# Patient Record
Sex: MALE | Race: WHITE | NOT HISPANIC OR LATINO | URBAN - METROPOLITAN AREA
[De-identification: names, ages, dates, MRNs, and addresses within clinical notes are randomized per-mention and may not be internally consistent; named-entity substitution may affect disease eponyms.]

---

## 2017-07-27 ENCOUNTER — TELEPHONE (OUTPATIENT)
Dept: TRANSPLANT | Facility: CLINIC | Age: 62
End: 2017-07-27

## 2017-07-27 NOTE — TELEPHONE ENCOUNTER
"Logged in as: [email or web address]. Logout.  Incomplete   Pending   Registered   Archived Change Log Done Living Kidney Donor Evaluation Completed: 2017 11:35:06 CT Updated: 2017 11:36:11 CT  Donor Name: Gabe Barboza MRN: [id number] Note: : 1955 Age: 62Gender: Male Donor Height: 5  8\" Weight (lb): 207 BMI: 31.5  Donor Race:  Ethnicity: Not / Donor Preferred Language: English  Required?: No Current Marital Status:   Demographics: Home Address: [address] City: [place] State: [place] Zip: [address] Country: [place]  Best Phone: [phone] Alt Phone: Donor Email: [email or web address] Phone Type: Mobile Alt Phone Type:   Preferred Contact Time(s): 09:00 AM-11:00 AM Preferred Contact Day(s): Mon  Donor Screen: PASSED Donor Referred by: Tx Candidate Donor self reported ABO: Unknown  Recipient Information: Recipient Name (Last, First): Aniket Gallegos Recipient :    1948  ... Donor Relationship: Acquaintance Recipient Diagnosis: Recipient ABO:   MEDICAL HISTORY:  BPH  Dietary Supplement  Hypercholesterolemia  Hypothyroidism  Joint Pain/Arthritis NOS  Traumatic Brain Injury (TBI)  other (\"mood and sleep\")  MEDICATIONS:  Acetaminophen  Cholecalciferol  Lamotrigine  Levothyroxine  Simvastatin  Tamsulosin  Thiamine  SURGICAL HISTORY:  None Reported  ALLERGIES:  NKDA  SOCIAL HISTORY:  EtOH: Daily (1-2 drinks/day)  Illicit Drug Use: Denies  Tobacco: Denies  SELF-REPORTED FUNCTIONAL STATUS:  \"I am able to participate in strenuous sports such as swimming, singles tennis, football, basketball, or skiing\"  Exercise (>3X per week)  REVIEW OF ORGAN SYSTEMS: Airway or Lungs: No Blood Disorder: No Cancer: No Diabetes,Thyroid,Adrenal,Endocrine Disorder: Yes Digestive or Liver: No Heart or Circulatory System: No Immune Diseases: No Kidneys and Bladder: No Male Health: Yes Muscles,Bones,Joints: Yes Neuro: Yes Psych: No  FAMILY HISTORY: " Confirmed:  Denied:  Cancer (denies)  Diabetes (denies)  Heart Disease (denies)  Hypertension (denies)  Kidney Disease (denies)  Kidney Stones (denies)  DONOR INFORMATION:  Level of Education: High school or secondary school degree complete Employment Status: Full Time Employer: [employer] Medical Insurance Status: Has medical insurance Current Accommodation: Owns own home/apartment Living Arrangement: With spouse Allow Disclosure to Recipient: Yes Paired Kidney Exchange Education Level: None Paired Kidney Exchange Participation Consent: Unsure Donor Motivation: Highly motivated donor  HIGH RISK BEHAVIOR:  Blood transfusion < 12 months. (NO)  Commercial sex < 12 months. (NO)  Illicit IV drug use < 5yrs. (NO)  Male:male sexual contact < 5yrs. (NO)  Other high risk sexual contact < 12 months. (NO)  EMERGENCY CONTACT INFORMATION:  Primary Secondary First Name: Ayo Last Name: Tamra Phone Number: [phone] Relationship: Child  First Name: Laura  Last Name: Tamra Phone Number: [phone] Relationship: Spouse  REASON FOR DONATION:   He is a friend of mine and I would like to help.  PHYSICIAN CONTACT INFORMATION:  PCP  Name: [facility]   City: [facility] State: [place]  Phone: [phone]  ADDITIONAL NOTES:   REVIEWED BY:    Ashwini  TODAY'S DATE:   07/27/2017  ... Done  He is a friend of mine and I would like to help.
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
## 2017-08-21 ENCOUNTER — TELEPHONE (OUTPATIENT)
Dept: TRANSPLANT | Facility: CLINIC | Age: 62
End: 2017-08-21

## 2017-08-21 ENCOUNTER — DOCUMENTATION ONLY (OUTPATIENT)
Dept: TRANSPLANT | Facility: CLINIC | Age: 62
End: 2017-08-21

## 2017-08-21 NOTE — TELEPHONE ENCOUNTER
Is abo incompat(A+-was in [employer])Urine has small amt blood. Glucose=105.Average GFR=91. Now informed Gabe. Is interested in PEP. Will now send orders to do UA with Micro,FBS and A1C.

## 2017-08-21 NOTE — LETTER
PHYSICIAN ORDERS      DATE & TIME ISSUED: 2017 9:26 AM  PATIENT NAME: Gabe Barboza   : 1955     [facility] MR#  [id number]     DIAGNOSIS:  Potential Living Donor  ICD-10 CODE: Z00.5    Please do the following lab tests:       1)Fasting Blood Sugar(FBS)       2)A1C       3)Urinalysis(UA) with Microscopic    Any questions please call: Ciara at [phone]    Please fax these results to [phone]-ATTN:Ciara.        Joce Mcmillan MD  Surgical Director, Kidney Transplantation

## 2017-08-21 NOTE — LETTER
REIMBURSEMENT INFORMATION FOR LIVING ORGAN DONORS    LIVING ORGAN DONOR: This form MUST accompany & remain attached to Orders &  given to Provider and/or Healthcare Facility Business Office    PROVIDER/FACILITY INSTRUCTIONS: By accepting to perform these services for living organ  donation, the provider/facility agrees to exclusively bill the [facility] instead of billing  the patient or any insurance provider and agrees to accept the reimbursement, as described below, as  payment in full for services rendered.    PROVIDER BILLING INSTRUCTIONS:  1. [facility] agrees to pay for all authorized testing ordered by our transplant  program that is related to living organ donation. The attached orders/tests are part of the donor  Evaluation.    2. Do not bill the donor or donor's insurance. Send an itemized invoice, claim or statement to:    [facility]  Transplant Finance/Donor Billing  [address] [place] [address]    3. Billing statements must include the patient first and last name, date of birth, the CPT procedure code  and date of service. Please bill service on the ORIGINAL UBO4 or 1500 with appropriate CPT/HCPCS  codes along with W-9 and send to the above address to insure timely reimbursement.    4. Claims should be submitted no later than six months from the date when services are rendered.  Claims denied for late submission should not be billed to the donor or their private insurance carrier.    5. [facility] will reimburse all charges at 100% of the Medicare Fee Schedule as  defined in the Code of Federal Regulations (CFR) 42, Chapter IV. This is to be considered payment  in full. [facility], the patient, and/or the patient's insurance are NOT to  be billed any balance, co-payment, or deductible, per Medicare regulations. **ATTN: Facility  providing services for attached/enclosed Living  Donor Orders; If facility does NOT AGREE to  the reimbursement rate stated above, PLEASE DENY SERVICES & refer Donor/patient back to  their [facility] Transplant Center.    6. Patients are NOT to make any payments at the time of service.    Please forward this information to your billing department so that a donor account can be set up with  these instructions.    Should you have any questions, please contact the Solid Organ Transplant office and ask for donor billing  at [phone] or [phone], Monday - Friday, 8:00 a.m. to 4:00 p.m.  Thank you for your assistance.

## 2017-10-17 ENCOUNTER — TELEPHONE (OUTPATIENT)
Dept: TRANSPLANT | Facility: CLINIC | Age: 62
End: 2017-10-17

## 2017-10-17 NOTE — TELEPHONE ENCOUNTER
Gabe and I keep missing each other's return phone calls. Now LM for him asking him to look at his Email. UFO=906. A1C=5.4. Suggested a 2hr gtt test. Explained procedure to him. Asked him if he'd like to cont process.

## 2017-10-19 ENCOUNTER — TELEPHONE (OUTPATIENT)
Dept: TRANSPLANT | Facility: CLINIC | Age: 62
End: 2017-10-19

## 2017-10-19 NOTE — TELEPHONE ENCOUNTER
Attempted to reach sev x's and FABIO's as well as Michael tried to contact me. Now sent Email. Asked if he wanted to cont testing. Glucose=111. Sent orders for 2hr gtt with instructions. Asked to let me know either way.

## 2018-02-21 ENCOUNTER — TELEPHONE (OUTPATIENT)
Dept: TRANSPLANT | Facility: CLINIC | Age: 63
End: 2018-02-21

## 2018-02-21 NOTE — TELEPHONE ENCOUNTER
Informed Gabe his 2 hr gtt was normal at 101. States he's having hernia repair beginning of March. Informed him Eval is next step. Advised he'd eval later and he wants to come in on last week he has off from work for hernia repair. Sydney is still working on a couple things also. Was [place]. Went to [place] in past 3 months. Will ask SW to check into financial assist with possibly gas or hotel.

## 2018-02-22 ENCOUNTER — TELEPHONE (OUTPATIENT)
Dept: TRANSPLANT | Facility: CLINIC | Age: 63
End: 2018-02-22

## 2018-02-22 DIAGNOSIS — Z00.5 TRANSPLANT DONOR EVALUATION: ICD-10-CM

## 2018-02-22 NOTE — TELEPHONE ENCOUNTER
I spoke to Mr. Barboza about applying for the [facility] margarita.  He would like to submit an application.  I e-mailed him the application paperwork today.  I will also connect with his recipient's  to request information from him.    ZHANNA Goins, [facility]  Clinical  and Independent Donor Advocate  [facility]  Pager:  [phone]  Direct:  [phone]

## 2018-03-12 ENCOUNTER — TELEPHONE (OUTPATIENT)
Dept: TRANSPLANT | Facility: CLINIC | Age: 63
End: 2018-03-12

## 2018-03-12 NOTE — TELEPHONE ENCOUNTER
I left a message for  And Mrs. Barboza today indicating that due to the recipient's high income, they would not qualify for assistance through [facility], but I would be able to use some of our donor margarita money through the [facility] to assist with hotel accommodations for his living kidney donor evaluation.  I asked them to call me back to discuss.    ZHANNA Goins, [facility]  Clinical  and Independent Donor Advocate  [facility] Transplant Center  Pager:  [phone]  Direct:  [phone]

## 2018-03-22 ASSESSMENT — ENCOUNTER SYMPTOMS: ARTHRALGIAS: 1

## 2018-03-23 ENCOUNTER — RADIANT APPOINTMENT (OUTPATIENT)
Dept: GENERAL RADIOLOGY | Facility: CLINIC | Age: 63
End: 2018-03-23
Attending: INTERNAL MEDICINE

## 2018-03-23 ENCOUNTER — INFUSION THERAPY VISIT (OUTPATIENT)
Dept: INFUSION THERAPY | Facility: CLINIC | Age: 63
End: 2018-03-23
Attending: INTERNAL MEDICINE

## 2018-03-23 ENCOUNTER — ALLIED HEALTH/NURSE VISIT (OUTPATIENT)
Dept: TRANSPLANT | Facility: CLINIC | Age: 63
End: 2018-03-23
Attending: TRANSPLANT SURGERY

## 2018-03-23 ENCOUNTER — OFFICE VISIT (OUTPATIENT)
Dept: TRANSPLANT | Facility: CLINIC | Age: 63
End: 2018-03-23
Attending: TRANSPLANT SURGERY

## 2018-03-23 ENCOUNTER — OFFICE VISIT (OUTPATIENT)
Dept: NEPHROLOGY | Facility: CLINIC | Age: 63
End: 2018-03-23
Attending: TRANSPLANT SURGERY

## 2018-03-23 ENCOUNTER — RADIANT APPOINTMENT (OUTPATIENT)
Dept: CT IMAGING | Facility: CLINIC | Age: 63
End: 2018-03-23
Attending: INTERNAL MEDICINE

## 2018-03-23 VITALS
SYSTOLIC BLOOD PRESSURE: 126 MMHG | OXYGEN SATURATION: 98 % | DIASTOLIC BLOOD PRESSURE: 76 MMHG | BODY MASS INDEX: 32.15 KG/M2 | WEIGHT: 217.1 LBS | HEART RATE: 61 BPM | HEIGHT: 69 IN

## 2018-03-23 DIAGNOSIS — Z00.5 TRANSPLANT DONOR EVALUATION: Primary | ICD-10-CM

## 2018-03-23 DIAGNOSIS — Z00.5 TRANSPLANT DONOR EVALUATION: ICD-10-CM

## 2018-03-23 LAB
ABO + RH BLD: NORMAL
ABO + RH BLD: NORMAL
ALBUMIN SERPL-MCNC: 3.8 G/DL (ref 3.4–5)
ALBUMIN UR-MCNC: NEGATIVE MG/DL
ALP SERPL-CCNC: 49 U/L (ref 40–150)
ALT SERPL W P-5'-P-CCNC: 40 U/L (ref 0–70)
ANION GAP SERPL CALCULATED.3IONS-SCNC: 6 MMOL/L (ref 3–14)
APPEARANCE UR: CLEAR
APTT PPP: 30 SEC (ref 22–37)
AST SERPL W P-5'-P-CCNC: 22 U/L (ref 0–45)
BILIRUB SERPL-MCNC: 0.4 MG/DL (ref 0.2–1.3)
BILIRUB UR QL STRIP: NEGATIVE
BLD GP AB SCN SERPL QL: NORMAL
BLOOD BANK CMNT PATIENT-IMP: NORMAL
BLOOD BANK CMNT PATIENT-IMP: NORMAL
BUN SERPL-MCNC: 15 MG/DL (ref 7–30)
CALCIUM SERPL-MCNC: 8.9 MG/DL (ref 8.5–10.1)
CHLORIDE SERPL-SCNC: 107 MMOL/L (ref 94–109)
CHOLEST SERPL-MCNC: 98 MG/DL
CMV IGG SERPL QL IA: <0.2 AI (ref 0–0.8)
CO2 SERPL-SCNC: 26 MMOL/L (ref 20–32)
COLOR UR AUTO: YELLOW
CREAT SERPL-MCNC: 0.84 MG/DL (ref 0.66–1.25)
CREAT UR-MCNC: 76 MG/DL
EBV VCA IGG SER QL IA: >8 AI (ref 0–0.8)
EBV VCA IGM SER QL IA: <0.2 AI (ref 0–0.8)
ERYTHROCYTE [DISTWIDTH] IN BLOOD BY AUTOMATED COUNT: 12 % (ref 10–15)
GFR SERPL CREATININE-BSD FRML MDRD: >90 ML/MIN/1.7M2
GLUCOSE SERPL-MCNC: 106 MG/DL (ref 70–99)
GLUCOSE UR STRIP-MCNC: NEGATIVE MG/DL
HBA1C MFR BLD: 5.6 % (ref 4.3–6)
HBV CORE AB SERPL QL IA: NONREACTIVE
HBV SURFACE AB SERPL IA-ACNC: 16.09 M[IU]/ML
HBV SURFACE AG SERPL QL IA: NONREACTIVE
HCT VFR BLD AUTO: 43.6 % (ref 40–53)
HCV AB SERPL QL IA: NONREACTIVE
HDLC SERPL-MCNC: 44 MG/DL
HGB BLD-MCNC: 15 G/DL (ref 13.3–17.7)
HGB UR QL STRIP: ABNORMAL
HIV 1+2 AB+HIV1 P24 AG SERPL QL IA: NONREACTIVE
INR PPP: 1.17 (ref 0.86–1.14)
KETONES UR STRIP-MCNC: NEGATIVE MG/DL
LDLC SERPL CALC-MCNC: 42 MG/DL
LEUKOCYTE ESTERASE UR QL STRIP: NEGATIVE
MCH RBC QN AUTO: 29.5 PG (ref 26.5–33)
MCHC RBC AUTO-ENTMCNC: 34.4 G/DL (ref 31.5–36.5)
MCV RBC AUTO: 86 FL (ref 78–100)
MICROALBUMIN UR-MCNC: 6 MG/L
MICROALBUMIN/CREAT UR: 7.22 MG/G CR (ref 0–17)
MUCOUS THREADS #/AREA URNS LPF: PRESENT /LPF
NITRATE UR QL: NEGATIVE
NONHDLC SERPL-MCNC: 54 MG/DL
PH UR STRIP: 6 PH (ref 5–7)
PHOSPHATE SERPL-MCNC: 3 MG/DL (ref 2.5–4.5)
PLATELET # BLD AUTO: 159 10E9/L (ref 150–450)
POTASSIUM SERPL-SCNC: 4 MMOL/L (ref 3.4–5.3)
PROT SERPL-MCNC: 7 G/DL (ref 6.8–8.8)
PROT UR-MCNC: 0.06 G/L
PROT/CREAT 24H UR: 0.07 G/G CR (ref 0–0.2)
PSA SERPL-ACNC: 1.31 UG/L (ref 0–4)
RBC # BLD AUTO: 5.08 10E12/L (ref 4.4–5.9)
RBC #/AREA URNS AUTO: 1 /HPF (ref 0–2)
SODIUM SERPL-SCNC: 138 MMOL/L (ref 133–144)
SOURCE: ABNORMAL
SP GR UR STRIP: 1.01 (ref 1–1.03)
SPECIMEN EXP DATE BLD: NORMAL
T PALLIDUM IGG+IGM SER QL: NEGATIVE
TRIGL SERPL-MCNC: 58 MG/DL
URATE SERPL-MCNC: 4.3 MG/DL (ref 3.5–7.2)
UROBILINOGEN UR STRIP-MCNC: 0 MG/DL (ref 0–2)
WBC # BLD AUTO: 4.8 10E9/L (ref 4–11)
WBC #/AREA URNS AUTO: <1 /HPF (ref 0–5)

## 2018-03-23 PROCEDURE — 25000128 H RX IP 250 OP 636: Mod: ZF | Performed by: INTERNAL MEDICINE

## 2018-03-23 PROCEDURE — 36415 COLL VENOUS BLD VENIPUNCTURE: CPT | Performed by: INTERNAL MEDICINE

## 2018-03-23 PROCEDURE — 86780 TREPONEMA PALLIDUM: CPT | Performed by: INTERNAL MEDICINE

## 2018-03-23 PROCEDURE — 86644 CMV ANTIBODY: CPT | Performed by: INTERNAL MEDICINE

## 2018-03-23 PROCEDURE — 86665 EPSTEIN-BARR CAPSID VCA: CPT | Performed by: INTERNAL MEDICINE

## 2018-03-23 PROCEDURE — 86706 HEP B SURFACE ANTIBODY: CPT | Performed by: INTERNAL MEDICINE

## 2018-03-23 PROCEDURE — 40000866 ZZHCL STATISTIC HIV 1/2 ANTIGEN/ANTIBODY PRETRANSPLANT ONLY: Performed by: INTERNAL MEDICINE

## 2018-03-23 PROCEDURE — 86682 HELMINTH ANTIBODY: CPT | Performed by: INTERNAL MEDICINE

## 2018-03-23 PROCEDURE — 86665 EPSTEIN-BARR CAPSID VCA: CPT | Mod: 91 | Performed by: INTERNAL MEDICINE

## 2018-03-23 PROCEDURE — G0463 HOSPITAL OUTPT CLINIC VISIT: HCPCS | Mod: ZF

## 2018-03-23 PROCEDURE — 86850 RBC ANTIBODY SCREEN: CPT | Performed by: INTERNAL MEDICINE

## 2018-03-23 PROCEDURE — 85027 COMPLETE CBC AUTOMATED: CPT | Performed by: INTERNAL MEDICINE

## 2018-03-23 PROCEDURE — 86803 HEPATITIS C AB TEST: CPT | Performed by: INTERNAL MEDICINE

## 2018-03-23 PROCEDURE — 86901 BLOOD TYPING SEROLOGIC RH(D): CPT | Performed by: INTERNAL MEDICINE

## 2018-03-23 PROCEDURE — 84100 ASSAY OF PHOSPHORUS: CPT | Performed by: INTERNAL MEDICINE

## 2018-03-23 PROCEDURE — 85730 THROMBOPLASTIN TIME PARTIAL: CPT | Performed by: INTERNAL MEDICINE

## 2018-03-23 PROCEDURE — 83036 HEMOGLOBIN GLYCOSYLATED A1C: CPT | Performed by: INTERNAL MEDICINE

## 2018-03-23 PROCEDURE — 85610 PROTHROMBIN TIME: CPT | Performed by: INTERNAL MEDICINE

## 2018-03-23 PROCEDURE — 86480 TB TEST CELL IMMUN MEASURE: CPT | Performed by: INTERNAL MEDICINE

## 2018-03-23 PROCEDURE — 80053 COMPREHEN METABOLIC PANEL: CPT | Performed by: INTERNAL MEDICINE

## 2018-03-23 PROCEDURE — 80061 LIPID PANEL: CPT | Performed by: INTERNAL MEDICINE

## 2018-03-23 PROCEDURE — 82542 COL CHROMOTOGRAPHY QUAL/QUAN: CPT | Performed by: INTERNAL MEDICINE

## 2018-03-23 PROCEDURE — 86704 HEP B CORE ANTIBODY TOTAL: CPT | Performed by: INTERNAL MEDICINE

## 2018-03-23 PROCEDURE — 82043 UR ALBUMIN QUANTITATIVE: CPT | Performed by: INTERNAL MEDICINE

## 2018-03-23 PROCEDURE — 84156 ASSAY OF PROTEIN URINE: CPT | Performed by: INTERNAL MEDICINE

## 2018-03-23 PROCEDURE — 86905 BLOOD TYPING RBC ANTIGENS: CPT | Performed by: INTERNAL MEDICINE

## 2018-03-23 PROCEDURE — 84550 ASSAY OF BLOOD/URIC ACID: CPT | Performed by: INTERNAL MEDICINE

## 2018-03-23 PROCEDURE — 86900 BLOOD TYPING SEROLOGIC ABO: CPT | Performed by: INTERNAL MEDICINE

## 2018-03-23 PROCEDURE — 81001 URINALYSIS AUTO W/SCOPE: CPT | Performed by: INTERNAL MEDICINE

## 2018-03-23 PROCEDURE — 87340 HEPATITIS B SURFACE AG IA: CPT | Performed by: INTERNAL MEDICINE

## 2018-03-23 PROCEDURE — G0103 PSA SCREENING: HCPCS | Performed by: INTERNAL MEDICINE

## 2018-03-23 PROCEDURE — 40000269 ZZH STATISTIC NO CHARGE FACILITY FEE

## 2018-03-23 RX ORDER — SUCRALFATE 1 G/1
1 TABLET ORAL 4 TIMES DAILY
COMMUNITY

## 2018-03-23 RX ORDER — IOPAMIDOL 755 MG/ML
100 INJECTION, SOLUTION INTRAVASCULAR ONCE
Status: COMPLETED | OUTPATIENT
Start: 2018-03-23 | End: 2018-03-23

## 2018-03-23 RX ORDER — LAMOTRIGINE 25 MG/1
25 TABLET ORAL AT BEDTIME
COMMUNITY

## 2018-03-23 RX ORDER — ACETAMINOPHEN 500 MG
1000 TABLET ORAL EVERY 6 HOURS PRN
COMMUNITY

## 2018-03-23 RX ADMIN — IOHEXOL 5 ML: 300 INJECTION, SOLUTION INTRAVENOUS at 09:34

## 2018-03-23 RX ADMIN — IOPAMIDOL 100 ML: 755 INJECTION, SOLUTION INTRAVASCULAR at 14:25

## 2018-03-23 ASSESSMENT — PAIN SCALES - GENERAL: PAINLEVEL: NO PAIN (0)

## 2018-03-23 NOTE — LETTER
3/23/2018       RE: Gabe Barboza  [address] [facility] [address]     Dear Colleague,    Thank you for referring your patient, Gabe Barboza, to the [facility] NEPHROLOGY at [facility]. Please see a copy of my visit note below.    Assessment and Plan:  1. Prospective kidney transplant donor with no issues that need to be addressed prior to donation. Patient's blood pressure is acceptable at this visit, kidney function appears to be acceptable with Iohexol pending, and urinalysis shows small blood on dipstick, but no wbc or rbc.    The patient currently has mid line abdominal hernia that he is planning on having repaired and at the same time is planning to have an inguinal hernia repair.    He does have a history of chronic back pain for which she currently uses naproxen.  I had recommended that he attempt to use Tylenol only for her chronic back pain.  If his pain control with Tylenol alone is acceptable I think that this would be useful information for the patient to use in making a decision of being a potential donor.    The patient's body mass index is 32.  I did discuss with him potential need for weight loss prior to donation that will be determined at her selection committee.    Hematuria by dipstick: The patient has no red blood cells in his urine with appropriately concentrated urine.  This likely represents false-positive in the setting of myoglobinuria or other adulterants such as vitamin C.    The patient is a 62-year-old male with a history of hyperlipidemia giving him to risk factors for occult coronary artery disease.  He is quite healthy and has had no exertional chest pain or shortness of breath.    I spent 45 minutes with this patient of which greater than 25 minutes was spent face-to-face counseling regarding the risks benefits and alternatives of being a potential kidney donor.  All questions were answered.    Discussed the risks of donating a kidney,  including the surgical risk and the possible risks of living with one kidney.    Education about expected post-donation kidney function and how chronic kidney disease (CKD) and end stage kidney disease (ESKD) might potentially impact the donor in the future, include, but not limited to:       - On average, donors will have 25-35% permanent loss of kidney function at donation.       - Baseline risk of ESKD may slightly exceed that of members of the general population with the same demographic profile.       - Donor risks must be interpreted in light of known epidemiology of both CKD or ESKD, such as that CKD generally develops in midlife (40-50 years old) and ESKD generally develops after age 60.       - Medical evaluation of young potential donors cannot predict lifetime risk of CKD or ESKD.       - Donors may be at higher risk for CKD if they sustain damage to the remaining kidney.       - Development of CKD and progression of ESKD may be more rapid with only 1 kidney.       - Some type of kidney replacement therapy, either kidney transplant or dialysis, is required when reaching ESKD.    Potential medical or surgical risks include, but not limited to:       - Death.       - Scars, pain, fatigue, and other consequences typical of any surgical procedure.       - Decreased kidney function.       - Abdominal or bowel symptoms, such as bloating and nausea, and developing bowel obstruction.       - Kidney failure (ESKD) and the need for a kidney transplant or dialysis for the donor.       - Impact of obesity, hypertension, or other donor-specific medical conditions on morbidity and mortality of the potential donor.    Patients overall evaluation will be discussed with the transplant team and a final recommendation on the patients' suitability to be a kidney transplant donor will be made at that time.    Consult:  Gabe Barboza was seen in consultation at the request of Dr. Joce Mcmillan for evaluation as a potential  kidney transplant donor.    Reason for Visit:  Gabe Barboza is a 62 year old male who presents for a kidney donor evaluation.  Patient would like to donate to a friend.    HPI:    Patient is a 63 yo male with no known past medical problems here for kidney donor evaluation.     Medical:  BPH  Insomnia - on lamictal  HLD  Hiatal hernia - on sucralfate  Back pain - aleve 1 tab daily.   Hypovitaminosis D    No surgeries currently, but planning repair of inguinal and umbilical hernia.     No known family hx of kidney disease.     No cigs - quit 40 years ago. Insignificant exposure. 2 drinks / day. No illicits.     Currently the patient denies any headaches or vision changes.  He has no problems with swallowing.  He has no chest pain or shortness of breath.  He denies any abdominal pain.  He has no dysuria or hematuria.  He denies any constipation or diarrhea.  He has no muscle weakness or numbness.  He denies rash.         Kidney Disease Hx:       h/o Kidney Problems: No  Family h/o Genetic Kidney Disease: No       h/o HTN: No    Usual BP: Not checked       h/o Protein in Urine: No  h/o Blood in Urine: No       h/o Kidney Stones: No  h/o Kidney Injury: No       h/o Recurrent UTI: No  h/o Genitourinary Problems: No       h/o Chronic NSAID Use: Yes 1 aleve daily x 3 years.         Other Medical Hx:       h/o DM: No   h/o Gestational DM: Not applicable       h/o Preeclampsia: Not applicable          h/o Gastrointestinal, Pancreas or Liver Problems: No       h/o Lung or Heart Problems: No       h/o Hematologic Problems: No  h/o Bleeding or Clotting Problems: No       h/o Cancer: No       h/o Infection Problems: No         Skin Cancer Risk:       h/o more than 50 moles: No       h/o extensive sun exposure: No       h/o melanoma: No       Family h/o melanoma: No         Mental Health Assessment:       h/o Depression: No       h/o Psychiatric Illness: No       h/o Suicidal Attempt(s): No    ROS:   A comprehensive review of  systems was obtained and negative, except as noted in the HPI or PMH.    PMH:   History was taken from the patient as noted below.  Past Medical History:   Diagnosis Date     BPH (benign prostatic hyperplasia)      Hernia of abdominal wall      HLD (hyperlipidemia)      Hypovitaminosis D      Insomnia        PSH:   History reviewed. No pertinent surgical history.  Personal or family history of anesthesia problems: No    Family Hx:   Family History   Problem Relation Age of Onset     Coronary Artery Disease Mother      Hypertension Mother           Specific Family Hx:       FH of DM: No       FH of CAD: Yes  Mother - CHF at age 80's FH of HTN: Yes mother       FH of Cancer: Yes dad - lung  FH of Kidney Cancer: No    Personal Hx:   Social History     Social History     Marital status:      Spouse name: N/A     Number of children: N/A     Years of education: N/A     Occupational History     Not on file.     Social History Main Topics     Smoking status: Former Smoker     Types: Cigarettes     Smokeless tobacco: Never Used      Comment: quit smoking 35 years ago.      Alcohol use Yes      Comment: 1-2 drinks a day      Drug use: No     Sexual activity: Not on file     Other Topics Concern     Not on file     Social History Narrative     No narrative on file          Specific Social Hx:       Health Insurance Status: Yes       Employment Status: Full time  Occupation:                   Living Arrangements: lives with their spouse       Social Support: Yes       Presence of increased risk for disease transmission behaviors as defined by PHS guidelines: No        Allergies:  No Known Allergies    Medications:  Prior to Admission medications    Medication Sig Start Date End Date Taking? Authorizing Provider   lamoTRIgine (LAMICTAL) 25 MG tablet Take 25 mg by mouth At Bedtime    Reported, Patient   LEVOTHYROXINE SODIUM PO Take 150 mcg by mouth    Reported, Patient   SIMVASTATIN PO Take 40 mg by mouth At  "Bedtime    Reported, Patient   sucralfate (CARAFATE) 1 GM tablet Take 1 g by mouth 4 times daily    Reported, Patient   TAMSULOSIN HCL PO Take 0.4 mg by mouth At Bedtime    Reported, Patient   acetaminophen (TYLENOL) 500 MG tablet Take 1,000 mg by mouth every 6 hours as needed for mild pain    Reported, Patient   VITAMIN D, CHOLECALCIFEROL, PO Take 1,000 Units by mouth daily    Reported, Patient   GLUCOSAMINE SULFATE PO Take 4,000 mg by mouth daily    Reported, Patient   NAPROXEN PO Take 220 mg by mouth 2 times daily as needed for moderate pain    Reported, Patient   THIAMINE HCL PO Take 50 mg by mouth daily    Reported, Patient     Vitals:  Vital Signs 3/23/2018 3/23/2018 3/23/2018   Systolic 130 114 126   Diastolic 85 78 76   Pulse 61 - -   Weight (LB) 217 lb 1.6 oz - -   Height 5' 9\" - -   BMI (Calculated) 32.13 - -   Pain - - -   O2 98 - -     Exam:   GENERAL APPEARANCE: alert and no distress  HENT: mouth without ulcers or lesions  LYMPHATICS: no cervical or supraclavicular nodes  RESP: lungs clear to auscultation - no rales, rhonchi or wheezes  CV: regular rhythm, normal rate, no rub, no murmur  EDEMA: no LE edema bilaterally  ABDOMEN: soft, nondistended, nontender, bowel sounds normal; midline small 1 cm fascial defect about 7 cm above umbilicus.  MS: extremities normal - no gross deformities noted, no evidence of inflammation in joints, no muscle tenderness  SKIN: no rash    Results:   Labs and imaging were ordered for this visit and reviewed by me.  Recent Results (from the past 336 hour(s))   EKG 12-lead complete w/read - Clinics    Collection Time: 03/23/18  8:12 AM   Result Value Ref Range    Interpretation ECG Click View Image link to view waveform and result    Routine UA with microscopic    Collection Time: 03/23/18  8:58 AM   Result Value Ref Range    Color Urine Yellow     Appearance Urine Clear     Glucose Urine Negative NEG^Negative mg/dL    Bilirubin Urine Negative NEG^Negative    Ketones Urine " Negative NEG^Negative mg/dL    Specific Gravity Urine 1.011 1.003 - 1.035    Blood Urine Small (A) NEG^Negative    pH Urine 6.0 5.0 - 7.0 pH    Protein Albumin Urine Negative NEG^Negative mg/dL    Urobilinogen mg/dL 0.0 0.0 - 2.0 mg/dL    Nitrite Urine Negative NEG^Negative    Leukocyte Esterase Urine Negative NEG^Negative    Source Midstream Urine     WBC Urine <1 0 - 5 /HPF    RBC Urine 1 0 - 2 /HPF    Mucous Urine Present (A) NEG^Negative /LPF   Albumin Random Urine Quantitative with Creat Ratio    Collection Time: 03/23/18  8:58 AM   Result Value Ref Range    Creatinine Urine 76 mg/dL    Albumin Urine mg/L 6 mg/L    Albumin Urine mg/g Cr 7.22 0 - 17 mg/g Cr   Protein  random urine with Creat Ratio    Collection Time: 03/23/18  8:58 AM   Result Value Ref Range    Protein Random Urine 0.06 g/L    Protein Total Urine g/gr Creatinine 0.07 0 - 0.2 g/g Cr   ABO/Rh type and screen    Collection Time: 03/23/18  9:10 AM   Result Value Ref Range    ABO A     RH(D) Pos     Antibody Screen Neg     Test Valid Only At          [facility]    Specimen Expires 03/26/2018    Lipid Profile    Collection Time: 03/23/18  9:11 AM   Result Value Ref Range    Cholesterol 98 <200 mg/dL    Triglycerides 58 <150 mg/dL    HDL Cholesterol 44 >39 mg/dL    LDL Cholesterol Calculated 42 <100 mg/dL    Non HDL Cholesterol 54 <130 mg/dL   Comprehensive metabolic panel    Collection Time: 03/23/18  9:11 AM   Result Value Ref Range    Sodium 138 133 - 144 mmol/L    Potassium 4.0 3.4 - 5.3 mmol/L    Chloride 107 94 - 109 mmol/L    Carbon Dioxide 26 20 - 32 mmol/L    Anion Gap 6 3 - 14 mmol/L    Glucose 106 (H) 70 - 99 mg/dL    Urea Nitrogen 15 7 - 30 mg/dL    Creatinine 0.84 0.66 - 1.25 mg/dL    GFR Estimate >90 >60 mL/min/1.7m2    GFR Estimate If Black >90 >60 mL/min/1.7m2    Calcium 8.9 8.5 - 10.1 mg/dL    Bilirubin Total 0.4 0.2 - 1.3 mg/dL    Albumin 3.8 3.4 - 5.0 g/dL    Protein Total 7.0 6.8 - 8.8  g/dL    Alkaline Phosphatase 49 40 - 150 U/L    ALT 40 0 - 70 U/L    AST 22 0 - 45 U/L   Phosphorus    Collection Time: 03/23/18  9:11 AM   Result Value Ref Range    Phosphorus 3.0 2.5 - 4.5 mg/dL   Prostate spec antigen screen    Collection Time: 03/23/18  9:11 AM   Result Value Ref Range    PSA 1.31 0 - 4 ug/L   Hemoglobin A1c    Collection Time: 03/23/18  9:11 AM   Result Value Ref Range    Hemoglobin A1C 5.6 4.3 - 6.0 %   INR    Collection Time: 03/23/18  9:11 AM   Result Value Ref Range    INR 1.17 (H) 0.86 - 1.14   Partial thromboplastin time    Collection Time: 03/23/18  9:11 AM   Result Value Ref Range    PTT 30 22 - 37 sec   CBC with platelets    Collection Time: 03/23/18  9:11 AM   Result Value Ref Range    WBC 4.8 4.0 - 11.0 10e9/L    RBC Count 5.08 4.4 - 5.9 10e12/L    Hemoglobin 15.0 13.3 - 17.7 g/dL    Hematocrit 43.6 40.0 - 53.0 %    MCV 86 78 - 100 fl    MCH 29.5 26.5 - 33.0 pg    MCHC 34.4 31.5 - 36.5 g/dL    RDW 12.0 10.0 - 15.0 %    Platelet Count 159 150 - 450 10e9/L   Uric acid    Collection Time: 03/23/18  9:11 AM   Result Value Ref Range    Uric Acid 4.3 3.5 - 7.2 mg/dL   Blood Group A Subtype    Collection Time: 03/23/18  9:11 AM   Result Value Ref Range    Antigen Type A1 Positive         Again, thank you for allowing me to participate in the care of your patient.      Sincerely,    Dany Martinez MD

## 2018-03-23 NOTE — DISCHARGE INSTRUCTIONS

## 2018-03-23 NOTE — LETTER
3/23/2018       RE: Gabe Barboza  [address] [facility] [address]     Dear Colleague,    Thank you for referring your patient, Gabe Barboza, to the [facility] SOLID ORGAN TRANSPLANT at [facility]. Please see a copy of my visit note below.    RE: Gabe Barboza  [facility] #[id number]           I saw your patient, Gabe Barboza, in consultation in our pretransplant clinic. He was here at clinic for evaluation as a possible kidney donor. He presented to clinic today with his wife.  Our donor coordinator shared our center-specific results with him.  We discussed:    (1) The risks of donation--including mortality (0.03% risk) and morbidity (approximately 1% risk of major morbidity).  We discussed the major reported causes of death after kidney donation (bleeding, infection, pulmonary embolism).  We discussed the potential complications, including:  bleeding requiring reoperation, infection requiring reoperation, pneumonia, bowel obstruction, infection at the site of the incision, hernia at the site of the incision, deep vein thrombosis, deep vein thrombosis with associated pulmonary embolism, and urinary tract infection.  I told him I could not possibly name all of the risks, but that he was at risk for any complications that could occur in any operation (and that a local library would have books/resources that could provide more detail).       (2) We also discussed the long-term risks of living with one kidney.  We talked in detail about the new publications suggesting slightly increased long-term risk of ESRD after donor nephrectomy.  We discussed the fact that most of the ESRD that has developed after donation has occurred in those donating to a relative; and that it is known that individuals who have a relative with ESRD are at increased risk of ESRD.     (3) The hospital stay and the fact that if all goes well, discharge would occur within two to three days after donor  nephrectomy.  We also discussed the fact that there would be no diet or fluid restrictions (again if all went well), and that the only new medication that he would be on would be pain medication.  We discussed the fact that most patients are on Tylenol or something similar within five to six days of surgery.      (4) The recovery time after surgery and the restrictions that are necessary:  a) no driving for a couple of weeks (or until he felt that his reaction would be adequate if he had to slam on the brakes; and b) no lifting over 10 pounds or any exercise that would stretch his abdominal muscles for six weeks (to allow the muscles to heal so that he doesn't develop a hernia).  We also discussed return to work, which could occur in approximately three to four weeks if he had a desk job or would require not returning to work for at least six weeks if the job required any heavy lifting or exercise.  We discussed the potential for not getting his pep and energy back for anywhere from two to six weeks after surgery and how there was a tremendous individual variation in return of full energy level.  I discussed our donor follow-up studies; and that in our surveys, 90% of donors had their energy back by 6 weeks postdonation.    (5) The concern about long distance travel for the first couple of weeks post-donation.  We discussed the risks of deep vein thrombosis associated with plane or car travel.  I recommended that, if flying, he should get up and walk around every 30-40 minutes; if driving he should ask the  to stop the car every 30-45 minutes so Mr. Barboza could take a short walk.    (6) The fact that the recipient could be on a waiting list for  donor transplant and would ultimately receive a kidney if Mr. Barboza did not donate.      I attempted to answer any remaining questions.  I also told him that should he have any questions, he should feel free to contact us.  We would be glad to answer any  questions either over the phone or at another clinic visit.  His transplant coordinator Nichole Voges and may be reached at [phone].  Thank you for the opportunity to see him.    I spent 30 minutes with this patient.  Over 95% that time was spent in counseling and coordination of care.             Yours truly,               Joce Mcmillan MD         Professor of Surgery         ([phone])      AJ/st    Again, thank you for allowing me to participate in the care of your patient.      Sincerely,    Joce Mcmillan MD

## 2018-03-23 NOTE — MR AVS SNAPSHOT
After Visit Summary   3/23/2018    Gabe Barboza    MRN: [id number]           Patient Information     Date Of Birth          1955        Visit Information        Provider Department      3/23/2018 8:30 AM UC 47 ATC; UC SPEC INFUSION [facility] Specialty and Procedure        Today's Diagnoses     Transplant donor evaluation    -  1       Follow-ups after your visit        Who to contact     If you have questions or need follow up information about today's clinic visit or your schedule please contact [facility] SPECIALTY AND PROCEDURE directly at [phone].  Normal or non-critical lab and imaging results will be communicated to you by mydecohart, letter or phone within 4 business days after the clinic has received the results. If you do not hear from us within 7 days, please contact the clinic through [employer] or phone. If you have a critical or abnormal lab result, we will notify you by phone as soon as possible.  Submit refill requests through [employer] or call your pharmacy and they will forward the refill request to us. Please allow 3 business days for your refill to be completed.          Additional Information About Your Visit        MyChart Information     [employer] gives you secure access to your electronic health record. If you see a primary care provider, you can also send messages to your care team and make appointments. If you have questions, please call your primary care clinic.  If you do not have a primary care provider, please call [phone] and they will assist you.        Care EveryWhere ID     This is your Care EveryWhere ID. This could be used by other organizations to access your [place] medical records  [id number]         Blood Pressure from Last 3 Encounters:   03/23/18 126/76    Weight from Last 3 Encounters:   03/23/18 98.5 kg (217 lb 1.6 oz)              We Performed the Following     Iohexol        Primary Care Provider  Fax #    [facility] [phone] [address] [facility] [address]        Equal Access to Services     HANNA VALENCIA : Jj Reyes, waparminderda jarrettkeiha, himajane arevalokeagankwan alfredemilykwan, chani flores paulaalma cortésraealex thomas. So [facility] [phone].    ATENCIÓN: Si habla español, tiene a martin disposición servicios gratuitos de asistencia lingüística. Carmen al [phone].    We comply with applicable federal civil rights laws and [place] laws. We do not discriminate on the basis of race, color, national origin, age, disability, sex, sexual orientation, or gender identity.            Thank you!     Thank you for choosing [facility] SPECIALTY AND PROCEDURE  for your care. Our goal is always to provide you with excellent care. Hearing back from our patients is one way we can continue to improve our services. Please take a few minutes to complete the written survey that you may receive in the mail after your visit with us. Thank you!             Your Updated Medication List - Protect others around you: Learn how to safely use, store and throw away your medicines at www.disposemymeds.org.          This list is accurate as of 3/23/18 11:59 PM.  Always use your most recent med list.                   Brand Name Dispense Instructions for use Diagnosis    acetaminophen 500 MG tablet    TYLENOL     Take 1,000 mg by mouth every 6 hours as needed for mild pain        GLUCOSAMINE SULFATE PO      Take 4,000 mg by mouth daily        lamoTRIgine 25 MG tablet    LaMICtal     Take 25 mg by mouth At Bedtime        LEVOTHYROXINE SODIUM PO      Take 150 mcg by mouth        NAPROXEN PO      Take 220 mg by mouth 2 times daily as needed for moderate pain        SIMVASTATIN PO      Take 40 mg by mouth At Bedtime        sucralfate 1 GM tablet    CARAFATE     Take 1 g by mouth 4 times daily        TAMSULOSIN HCL PO      Take 0.4 mg by mouth At Bedtime        THIAMINE HCL PO      Take  50 mg by mouth daily        VITAMIN D (CHOLECALCIFEROL) PO      Take 1,000 Units by mouth daily

## 2018-03-23 NOTE — NURSING NOTE
"Chief Complaint   Patient presents with     Transplant Donor Evaluation     Kidney Donor Eval        Initial /85  Pulse 61  Ht 1.753 m (5' 9\")  Wt 98.5 kg (217 lb 1.6 oz)  SpO2 98%  BMI 32.06 kg/m2 Estimated body mass index is 32.06 kg/(m^2) as calculated from the following:    Height as of this encounter: 1.753 m (5' 9\").    Weight as of this encounter: 98.5 kg (217 lb 1.6 oz).  Medication Reconciliation: complete    "

## 2018-03-23 NOTE — MR AVS SNAPSHOT
After Visit Summary   3/23/2018    Gabe Barboza    MRN: [id number]           Patient Information     Date Of Birth          1955        Visit Information        Provider Department      3/23/2018 10:00 AM Adriana Obrien LICSW [facility] Solid Organ Transplant        Today's Diagnoses     Transplant donor evaluation    -  1       Follow-ups after your visit        Who to contact     If you have questions or need follow up information about today's clinic visit or your schedule please contact [facility] SOLID ORGAN TRANSPLANT directly at [phone].  Normal or non-critical lab and imaging results will be communicated to you by Shogetherhart, letter or phone within 4 business days after the clinic has received the results. If you do not hear from us within 7 days, please contact the clinic through [employer] [email or web address] or phone. If you have a critical or abnormal lab result, we will notify you by phone as soon as possible.  Submit refill requests through [employer] or call your pharmacy and they will forward the refill request to us. Please allow 3 business days for your refill to be completed.          Additional Information About Your Visit        MyChart Information     [employer] gives you secure access to your electronic health record. If you see a primary care provider, you can also send messages to your care team and make appointments. If you have questions, please call your primary care clinic.  If you do not have a primary care provider, please call [phone] and they will assist you.        Care EveryWhere ID     This is your Care EveryWhere ID. This could be used by other organizations to access your [place] medical records  [id number]         Blood Pressure from Last 3 Encounters:   03/23/18 126/76    Weight from Last 3 Encounters:   03/23/18 98.5 kg (217 lb 1.6 oz)              Today, you had the following     No orders found for display       Primary Care Provider Fax #    [facility]  [place] [phone]       [facility] [facility] [address]        Equal Access to Services     ANTHONYMILES ANNIE : Hadii freddy Reyes, justin murillo, crystroy arevalokeagankwan peterson, chani cortésraealex thomas. So [facility] [phone].    ATENCIÓN: Si habla español, tiene a martin disposición servicios gratuitos de asistencia lingüística. Llame al [phone].    We comply with applicable federal civil rights laws and [place] laws. We do not discriminate on the basis of race, color, national origin, age, disability, sex, sexual orientation, or gender identity.            Thank you!     Thank you for choosing [facility] SOLID ORGAN TRANSPLANT  for your care. Our goal is always to provide you with excellent care. Hearing back from our patients is one way we can continue to improve our services. Please take a few minutes to complete the written survey that you may receive in the mail after your visit with us. Thank you!             Your Updated Medication List - Protect others around you: Learn how to safely use, store and throw away your medicines at www.disposemymeds.org.          This list is accurate as of 3/23/18 11:59 PM.  Always use your most recent med list.                   Brand Name Dispense Instructions for use Diagnosis    acetaminophen 500 MG tablet    TYLENOL     Take 1,000 mg by mouth every 6 hours as needed for mild pain        GLUCOSAMINE SULFATE PO      Take 4,000 mg by mouth daily        lamoTRIgine 25 MG tablet    LaMICtal     Take 25 mg by mouth At Bedtime        LEVOTHYROXINE SODIUM PO      Take 150 mcg by mouth        NAPROXEN PO      Take 220 mg by mouth 2 times daily as needed for moderate pain        SIMVASTATIN PO      Take 40 mg by mouth At Bedtime        sucralfate 1 GM tablet    CARAFATE     Take 1 g by mouth 4 times daily        TAMSULOSIN HCL PO      Take 0.4 mg by mouth At Bedtime        THIAMINE HCL PO      Take 50 mg by mouth daily        VITAMIN D (CHOLECALCIFEROL)  PO      Take 1,000 Units by mouth daily

## 2018-03-23 NOTE — LETTER
3/23/2018       RE: Gabe Barboza  [address] [facility] [address]     Dear Colleague,    Thank you for referring your patient, Gabe Barboza, to the [facility] SOLID ORGAN TRANSPLANT at [facility]. Please see a copy of my visit note below.    Patient attended all appointments and completed all scheduled tests.  Patient to follow up with Transplant Coordinator.  Patient stated understanding and has contact numbers.      Again, thank you for allowing me to participate in the care of your patient.      Sincerely,    Transplant Evaluation Resource

## 2018-03-23 NOTE — PROGRESS NOTES
Assessment and Plan:  1. Prospective kidney transplant donor with no issues that need to be addressed prior to donation. Patient's blood pressure is acceptable at this visit, kidney function appears to be acceptable with Iohexol pending, and urinalysis shows small blood on dipstick, but no wbc or rbc.    The patient currently has mid line abdominal hernia that he is planning on having repaired and at the same time is planning to have an inguinal hernia repair.    He does have a history of chronic back pain for which she currently uses naproxen.  I had recommended that he attempt to use Tylenol only for her chronic back pain.  If his pain control with Tylenol alone is acceptable I think that this would be useful information for the patient to use in making a decision of being a potential donor.    The patient's body mass index is 32.  I did discuss with him potential need for weight loss prior to donation that will be determined at her selection committee.    Hematuria by dipstick: The patient has no red blood cells in his urine with appropriately concentrated urine.  This likely represents false-positive in the setting of myoglobinuria or other adulterants such as vitamin C.    The patient is a 62-year-old male with a history of hyperlipidemia giving him to risk factors for occult coronary artery disease.  He is quite healthy and has had no exertional chest pain or shortness of breath.    I spent 45 minutes with this patient of which greater than 25 minutes was spent face-to-face counseling regarding the risks benefits and alternatives of being a potential kidney donor.  All questions were answered.    Discussed the risks of donating a kidney, including the surgical risk and the possible risks of living with one kidney.    Education about expected post-donation kidney function and how chronic kidney disease (CKD) and end stage kidney disease (ESKD) might potentially impact the donor in the future, include, but not  limited to:       - On average, donors will have 25-35% permanent loss of kidney function at donation.       - Baseline risk of ESKD may slightly exceed that of members of the general population with the same demographic profile.       - Donor risks must be interpreted in light of known epidemiology of both CKD or ESKD, such as that CKD generally develops in midlife (40-50 years old) and ESKD generally develops after age 60.       - Medical evaluation of young potential donors cannot predict lifetime risk of CKD or ESKD.       - Donors may be at higher risk for CKD if they sustain damage to the remaining kidney.       - Development of CKD and progression of ESKD may be more rapid with only 1 kidney.       - Some type of kidney replacement therapy, either kidney transplant or dialysis, is required when reaching ESKD.    Potential medical or surgical risks include, but not limited to:       - Death.       - Scars, pain, fatigue, and other consequences typical of any surgical procedure.       - Decreased kidney function.       - Abdominal or bowel symptoms, such as bloating and nausea, and developing bowel obstruction.       - Kidney failure (ESKD) and the need for a kidney transplant or dialysis for the donor.       - Impact of obesity, hypertension, or other donor-specific medical conditions on morbidity and mortality of the potential donor.    Patients overall evaluation will be discussed with the transplant team and a final recommendation on the patients' suitability to be a kidney transplant donor will be made at that time.    Consult:  Gabe Barboza was seen in consultation at the request of Dr. Joce Mcmillan for evaluation as a potential kidney transplant donor.    Reason for Visit:  Gabe Barboza is a 62 year old male who presents for a kidney donor evaluation.  Patient would like to donate to a friend.    HPI:    Patient is a 63 yo male with no known past medical problems here for kidney donor  evaluation.     Medical:  BPH  Insomnia - on lamictal  HLD  Hiatal hernia - on sucralfate  Back pain - aleve 1 tab daily.   Hypovitaminosis D    No surgeries currently, but planning repair of inguinal and umbilical hernia.     No known family hx of kidney disease.     No cigs - quit 40 years ago. Insignificant exposure. 2 drinks / day. No illicits.     Currently the patient denies any headaches or vision changes.  He has no problems with swallowing.  He has no chest pain or shortness of breath.  He denies any abdominal pain.  He has no dysuria or hematuria.  He denies any constipation or diarrhea.  He has no muscle weakness or numbness.  He denies rash.         Kidney Disease Hx:       h/o Kidney Problems: No  Family h/o Genetic Kidney Disease: No       h/o HTN: No    Usual BP: Not checked       h/o Protein in Urine: No  h/o Blood in Urine: No       h/o Kidney Stones: No  h/o Kidney Injury: No       h/o Recurrent UTI: No  h/o Genitourinary Problems: No       h/o Chronic NSAID Use: Yes 1 aleve daily x 3 years.         Other Medical Hx:       h/o DM: No   h/o Gestational DM: Not applicable       h/o Preeclampsia: Not applicable          h/o Gastrointestinal, Pancreas or Liver Problems: No       h/o Lung or Heart Problems: No       h/o Hematologic Problems: No  h/o Bleeding or Clotting Problems: No       h/o Cancer: No       h/o Infection Problems: No         Skin Cancer Risk:       h/o more than 50 moles: No       h/o extensive sun exposure: No       h/o melanoma: No       Family h/o melanoma: No         Mental Health Assessment:       h/o Depression: No       h/o Psychiatric Illness: No       h/o Suicidal Attempt(s): No    ROS:   A comprehensive review of systems was obtained and negative, except as noted in the HPI or PMH.    PMH:   History was taken from the patient as noted below.  Past Medical History:   Diagnosis Date     BPH (benign prostatic hyperplasia)      Hernia of abdominal wall      HLD (hyperlipidemia)       Hypovitaminosis D      Insomnia        PSH:   History reviewed. No pertinent surgical history.  Personal or family history of anesthesia problems: No    Family Hx:   Family History   Problem Relation Age of Onset     Coronary Artery Disease Mother      Hypertension Mother           Specific Family Hx:       FH of DM: No       FH of CAD: Yes  Mother - CHF at age 80's FH of HTN: Yes mother       FH of Cancer: Yes dad - lung  FH of Kidney Cancer: No    Personal Hx:   Social History     Social History     Marital status:      Spouse name: N/A     Number of children: N/A     Years of education: N/A     Occupational History     Not on file.     Social History Main Topics     Smoking status: Former Smoker     Types: Cigarettes     Smokeless tobacco: Never Used      Comment: quit smoking 35 years ago.      Alcohol use Yes      Comment: 1-2 drinks a day      Drug use: No     Sexual activity: Not on file     Other Topics Concern     Not on file     Social History Narrative     No narrative on file          Specific Social Hx:       Health Insurance Status: Yes       Employment Status: Full time  Occupation:                   Living Arrangements: lives with their spouse       Social Support: Yes       Presence of increased risk for disease transmission behaviors as defined by [facility] guidelines: No        Allergies:  No Known Allergies    Medications:  Prior to Admission medications    Medication Sig Start Date End Date Taking? Authorizing Provider   lamoTRIgine (LAMICTAL) 25 MG tablet Take 25 mg by mouth At Bedtime    Reported, Patient   LEVOTHYROXINE SODIUM PO Take 150 mcg by mouth    Reported, Patient   SIMVASTATIN PO Take 40 mg by mouth At Bedtime    Reported, Patient   sucralfate (CARAFATE) 1 GM tablet Take 1 g by mouth 4 times daily    Reported, Patient   TAMSULOSIN HCL PO Take 0.4 mg by mouth At Bedtime    Reported, Patient   acetaminophen (TYLENOL) 500 MG tablet Take 1,000 mg by mouth every 6 hours as  "needed for mild pain    Reported, Patient   VITAMIN D, CHOLECALCIFEROL, PO Take 1,000 Units by mouth daily    Reported, Patient   GLUCOSAMINE SULFATE PO Take 4,000 mg by mouth daily    Reported, Patient   NAPROXEN PO Take 220 mg by mouth 2 times daily as needed for moderate pain    Reported, Patient   THIAMINE HCL PO Take 50 mg by mouth daily    Reported, Patient     Vitals:  Vital Signs 3/23/2018 3/23/2018 3/23/2018   Systolic 130 114 126   Diastolic 85 78 76   Pulse 61 - -   Weight (LB) 217 lb 1.6 oz - -   Height 5' 9\" - -   BMI (Calculated) 32.13 - -   Pain - - -   O2 98 - -     Exam:   GENERAL APPEARANCE: alert and no distress  HENT: mouth without ulcers or lesions  LYMPHATICS: no cervical or supraclavicular nodes  RESP: lungs clear to auscultation - no rales, rhonchi or wheezes  CV: regular rhythm, normal rate, no rub, no murmur  EDEMA: no LE edema bilaterally  ABDOMEN: soft, nondistended, nontender, bowel sounds normal; midline small 1 cm fascial defect about 7 cm above umbilicus.  MS: extremities normal - no gross deformities noted, no evidence of inflammation in joints, no muscle tenderness  SKIN: no rash    Results:   Labs and imaging were ordered for this visit and reviewed by me.  Recent Results (from the past 336 hour(s))   EKG 12-lead complete w/read - Clinics    Collection Time: 03/23/18  8:12 AM   Result Value Ref Range    Interpretation ECG Click View Image link to view waveform and result    Routine UA with microscopic    Collection Time: 03/23/18  8:58 AM   Result Value Ref Range    Color Urine Yellow     Appearance Urine Clear     Glucose Urine Negative NEG^Negative mg/dL    Bilirubin Urine Negative NEG^Negative    Ketones Urine Negative NEG^Negative mg/dL    Specific Gravity Urine 1.011 1.003 - 1.035    Blood Urine Small (A) NEG^Negative    pH Urine 6.0 5.0 - 7.0 pH    Protein Albumin Urine Negative NEG^Negative mg/dL    Urobilinogen mg/dL 0.0 0.0 - 2.0 mg/dL    Nitrite Urine Negative " NEG^Negative    Leukocyte Esterase Urine Negative NEG^Negative    Source Midstream Urine     WBC Urine <1 0 - 5 /HPF    RBC Urine 1 0 - 2 /HPF    Mucous Urine Present (A) NEG^Negative /LPF   Albumin Random Urine Quantitative with Creat Ratio    Collection Time: 03/23/18  8:58 AM   Result Value Ref Range    Creatinine Urine 76 mg/dL    Albumin Urine mg/L 6 mg/L    Albumin Urine mg/g Cr 7.22 0 - 17 mg/g Cr   Protein  random urine with Creat Ratio    Collection Time: 03/23/18  8:58 AM   Result Value Ref Range    Protein Random Urine 0.06 g/L    Protein Total Urine g/gr Creatinine 0.07 0 - 0.2 g/g Cr   ABO/Rh type and screen    Collection Time: 03/23/18  9:10 AM   Result Value Ref Range    ABO A     RH(D) Pos     Antibody Screen Neg     Test Valid Only At          [facility]    Specimen Expires 03/26/2018    Lipid Profile    Collection Time: 03/23/18  9:11 AM   Result Value Ref Range    Cholesterol 98 <200 mg/dL    Triglycerides 58 <150 mg/dL    HDL Cholesterol 44 >39 mg/dL    LDL Cholesterol Calculated 42 <100 mg/dL    Non HDL Cholesterol 54 <130 mg/dL   Comprehensive metabolic panel    Collection Time: 03/23/18  9:11 AM   Result Value Ref Range    Sodium 138 133 - 144 mmol/L    Potassium 4.0 3.4 - 5.3 mmol/L    Chloride 107 94 - 109 mmol/L    Carbon Dioxide 26 20 - 32 mmol/L    Anion Gap 6 3 - 14 mmol/L    Glucose 106 (H) 70 - 99 mg/dL    Urea Nitrogen 15 7 - 30 mg/dL    Creatinine 0.84 0.66 - 1.25 mg/dL    GFR Estimate >90 >60 mL/min/1.7m2    GFR Estimate If Black >90 >60 mL/min/1.7m2    Calcium 8.9 8.5 - 10.1 mg/dL    Bilirubin Total 0.4 0.2 - 1.3 mg/dL    Albumin 3.8 3.4 - 5.0 g/dL    Protein Total 7.0 6.8 - 8.8 g/dL    Alkaline Phosphatase 49 40 - 150 U/L    ALT 40 0 - 70 U/L    AST 22 0 - 45 U/L   Phosphorus    Collection Time: 03/23/18  9:11 AM   Result Value Ref Range    Phosphorus 3.0 2.5 - 4.5 mg/dL   Prostate spec antigen screen    Collection Time: 03/23/18  9:11  AM   Result Value Ref Range    PSA 1.31 0 - 4 ug/L   Hemoglobin A1c    Collection Time: 03/23/18  9:11 AM   Result Value Ref Range    Hemoglobin A1C 5.6 4.3 - 6.0 %   INR    Collection Time: 03/23/18  9:11 AM   Result Value Ref Range    INR 1.17 (H) 0.86 - 1.14   Partial thromboplastin time    Collection Time: 03/23/18  9:11 AM   Result Value Ref Range    PTT 30 22 - 37 sec   CBC with platelets    Collection Time: 03/23/18  9:11 AM   Result Value Ref Range    WBC 4.8 4.0 - 11.0 10e9/L    RBC Count 5.08 4.4 - 5.9 10e12/L    Hemoglobin 15.0 13.3 - 17.7 g/dL    Hematocrit 43.6 40.0 - 53.0 %    MCV 86 78 - 100 fl    MCH 29.5 26.5 - 33.0 pg    MCHC 34.4 31.5 - 36.5 g/dL    RDW 12.0 10.0 - 15.0 %    Platelet Count 159 150 - 450 10e9/L   Uric acid    Collection Time: 03/23/18  9:11 AM   Result Value Ref Range    Uric Acid 4.3 3.5 - 7.2 mg/dL   Blood Group A Subtype    Collection Time: 03/23/18  9:11 AM   Result Value Ref Range    Antigen Type A1 Positive

## 2018-03-23 NOTE — PROGRESS NOTES
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 or s/w, cookies, pudding, 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    Dinner Baked potato with veggie and chix or pork/pheasant or hot dogs    Snacks None    Beverages Juice in summer, 16 oz chocolate milk/day, 1 hot chocolate/day, minimal water   Alcohol 14 drinks/week on average- michelob wilson light or deepali with diet soda    Dining out 1x/week + 5x/week for lunch at school      Physical Activity  Works as a  and also does farming      Anthropometrics  Height:   69 in   BMI:    32    Weight Status:Obesity Grade I BMI 30-34.9   Weight:  217 lbs            IBW (lb): 160  % IBW: 136    Wt Hx: No changes in weight     Adj/dosing BW: 174 lbs/79 kg       Labs  Recent Labs   Lab Test  03/23/18   0911   CHOL  98   HDL  44   LDL  42   TRIG  58       FBG = 106; previous levels 111, 104, 101, 95, 120 (were part of 2 h GTT at [facility])  A1C = 5.6    Prediction of Incident Diabetes Mellitus in Middle-aged Adults: The [place] Offspring Study  Joseph Badillo MD; James B. Meigs, MD, MPH; Jennifer Garcia, PhD; Madalyn Stewart MD, MPH; Mehdi Shelby MD; Ruperto Ayala Sr,   PhD  Pt's estimated risk for T2DM (per Table 6 above)  Pt received points for the following criteria: BG>100, BMI>30  Total points: 15  8-Year risk of T2DM: 7%    Estimated Nutrition Needs  Energy  1580    (20 kcal/kg for desired weight loss)         Protein  63-79    (0.8-1 g/kg for maintenance)           Fluid  1 ml/kcal or per MD     Nutrition Diagnosis  Obesity r/t excessive energy intake and inadequate physical activity AEB BMI >30.     Nutrition Intervention  Nutrition education provided:  Discussed strategies for wt loss (portion sizes, increased activity, etc). Encouraged pt to reduce alcohol consumption, avoid beverages that are calorie-containing and increase water intake, increase fruit and veggie consumption while reducing starch consumption (have 1/2 baked potato instead of a whole one, etc).     Reviewed overall healthy diet guidelines for pre and post kidney donation. Discussed maintenance of a healthy weight, Na+ intake <3000 mg/day, and avoidance of herbal supplements post donation d/t unknown effects on the kidney.    Patient Understanding: Pt verbalized understanding of education provided.  Expected Compliance: Good  Follow-Up Plans: PRN     Nutrition Goals  1. Pt to verbalize understanding of education provided   2. BMI<30 or <203 lbs for donation    Provided pt with contact info.   Trista Romero RD, LD  [facility] [phone]
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
## 2018-03-23 NOTE — MR AVS SNAPSHOT
After Visit Summary   3/23/2018    Gabe Barboza    MRN: [id number]           Patient Information     Date Of Birth          1955        Visit Information        Provider Department      3/23/2018 1:00 PM Dany Martinez MD [facility]        Today's Diagnoses     Transplant donor evaluation    -  1       Follow-ups after your visit        Who to contact     If you have questions or need follow up information about today's clinic visit or your schedule please contact [facility] directly at [phone].  Normal or non-critical lab and imaging results will be communicated to you by MyChart, letter or phone within 4 business days after the clinic has received the results. If you do not hear from us within 7 days, please contact the clinic through [employer] or phone. If you have a critical or abnormal lab result, we will notify you by phone as soon as possible.  Submit refill requests through Adeze or call your pharmacy and they will forward the refill request to us. Please allow 3 business days for your refill to be completed.          Additional Information About Your Visit        MyChart Information     [employer]ze gives you secure access to your electronic health record. If you see a primary care provider, you can also send messages to your care team and make appointments. If you have questions, please call your primary care clinic.  If you do not have a primary care provider, please call [phone] and they will assist you.        Care EveryWhere ID     This is your Care EveryWhere ID. This could be used by other organizations to access your [place] medical records  [id number]         Blood Pressure from Last 3 Encounters:   03/23/18 126/76    Weight from Last 3 Encounters:   03/23/18 98.5 kg (217 lb 1.6 oz)              Today, you had the following     No orders found for display       Primary Care Provider Fax #    [facility] [phone]  [facility] [address]        Equal Access to Services     HANNA VALENCIA : Hadii aad ku hadhaonicholas Reyes, waparminderda chidi, qazulemachani meier. So [facility] [place] [facility] [phone].    ATENCIÓN: Si habla español, tiene a martin disposición servicios gratuitos de asistencia lingüística. Llame al [phone].    We comply with applicable federal civil rights laws and [place] laws. We do not discriminate on the basis of race, color, national origin, age, disability, sex, sexual orientation, or gender identity.            Thank you!     Thank you for choosing [facility]  for your care. Our goal is always to provide you with excellent care. Hearing back from our patients is one way we can continue to improve our services. Please take a few minutes to complete the written survey that you may receive in the mail after your visit with us. Thank you!             Your Updated Medication List - Protect others around you: Learn how to safely use, store and throw away your medicines at www.disposemymeds.org.          This list is accurate as of 3/23/18 11:59 PM.  Always use your most recent med list.                   Brand Name Dispense Instructions for use Diagnosis    acetaminophen 500 MG tablet    TYLENOL     Take 1,000 mg by mouth every 6 hours as needed for mild pain        GLUCOSAMINE SULFATE PO      Take 4,000 mg by mouth daily        lamoTRIgine 25 MG tablet    LaMICtal     Take 25 mg by mouth At Bedtime        LEVOTHYROXINE SODIUM PO      Take 150 mcg by mouth        NAPROXEN PO      Take 220 mg by mouth 2 times daily as needed for moderate pain        SIMVASTATIN PO      Take 40 mg by mouth At Bedtime        sucralfate 1 GM tablet    CARAFATE     Take 1 g by mouth 4 times daily        TAMSULOSIN HCL PO      Take 0.4 mg by mouth At Bedtime        THIAMINE HCL PO      Take 50 mg by mouth daily        VITAMIN D (CHOLECALCIFEROL) PO      Take 1,000 Units by mouth daily

## 2018-03-23 NOTE — PROGRESS NOTES
Donor Iohexol test    Gabe Barboza presents today to [facility] for a Donor Iohexol test.      Progress note:  ID verified by name and .     The following information was verified with the patient:  Female Patients is there any possibility of being pregnant n/a  Is there a history of allergy (skin rash, swelling, ect) to:   A.  Iodine (except skin reactions to betadine): No   B. Intravenous radio-contrast agents: No   C. Seafood No    R.N. provided patient with educational handout regarding timed test. Yes    20 gauge PIV placed in left AC.  Iohexol administered.  Following iohexol administration extension set replaced.  PIV left in place for blood draws and CT this afternoon    Medication administered:  Iohexol (Omnipaque 300mg iodine/ml concentration) 5 mls.    Start time: 934  Stop time: 936    Drug Waste Record    Drug Name: iohexol  Dose: 5ml  Route administered: IV  NDC #: 0407-1413-10  Amount of waste(mL):5ml  Reason for waste: Single use vial    Administrations This Visit     iohexol (OMNIPAQUE 300) injection 5 mL     Admin Date Action Dose Route Administered By             2018 Given 5 mL Intravenous Opal Longo RN                              Evaluation nurse in transplant to draw labs at 2 and 4 hours post iohexol administration.  Patient given a slip with the times to get labs drawn and verbalized understanding of the plan.    Patient tolerated the procedure:  Yes    After the infusion patient was discharged to the next appointment.    Opal Longo RN

## 2018-03-23 NOTE — MR AVS SNAPSHOT
After Visit Summary   3/23/2018    Gabe Barboza    MRN: [id number]           Patient Information     Date Of Birth          1955        Visit Information        Provider Department      3/23/2018 8:00 AM  TXC EVALUATION [facility] Trans[facility]        Today's Diagnoses     Transplant donor evaluation    -  1       Follow-ups after your visit        Your next 10 appointments already scheduled     Mar 23, 2018  2:30 PM CDT   (Arrive by 2:15 PM)   XR CHEST 2 VIEWS with UCXR1   [facility] Xray ([facility])    [address] [address]   [phone]           Please bring a list of your current medicines to your exam. (Include vitamins, minerals and over-thecounter medicines.) Leave your valuables at home.  Tell your doctor if there is a chance you may be pregnant.  You do not need to do anything special for this exam.            Mar 23, 2018  2:40 PM CDT   (Arrive by 2:25 PM)   CT RENAL ANGIO with UCCT2   [facility] CT ([facility])    [address] [address] [phone]           Please bring any scans or X-rays taken at other hospitals, if similar tests were done. Also bring a list of your medicines, including vitamins, minerals and over-the-counter drugs. It is safest to leave personal items at home.  Be sure to tell your doctor:   If you have any allergies.   If there s any chance you are pregnant.   If you are breastfeeding.    If you have diabetes as your medication may need to be adjusted for this exam.  You will have contrast for this exam. To prepare:   Do not eat or drink for 2 hours before your exam. If you need to take medicine, you may take it with small sips of water. (We may ask you to take liquid medicine as well.)   The day before your exam, drink extra fluids at least six 8-ounce glasses (unless your doctor tells you to  "restrict your fluids).  Patients over 70 or patients with diabetes or kidney problems:   If you haven t had a blood test (creatinine test) within the last 30 days, the Cardiologist/Radiologist may require you to get this test prior to your exam.  Please wear loose clothing, such as a sweat suit or jogging clothes. Avoid snaps, zippers and other metal. We may ask you to undress and put on a hospital gown.  If you have any questions, please call the Imaging Department where you will have your exam.              Who to contact     If you have questions or need follow up information about today's clinic visit or your schedule please contact [facility] directly at [phone].  Normal or non-critical lab and imaging results will be communicated to you by [employer], letter or phone within 4 business days after the clinic has received the results. If you do not hear from us within 7 days, please contact the clinic through [employer] or phone. If you have a critical or abnormal lab result, we will notify you by phone as soon as possible.  Submit refill requests through [employer] or call your pharmacy and they will forward the refill request to us. Please allow 3 business days for your refill to be completed.          Additional Information About Your Visit        [employer] Information     [employer] gives you secure access to your electronic health record. If you see a primary care provider, you can also send messages to your care team and make appointments. If you have questions, please call your primary care clinic.  If you do not have a primary care provider, please call [phone] and they will assist you.        Care EveryWhere ID     This is your Care EveryWhere ID. This could be used by other organizations to access your [place] medical records  [id number]        Your Vitals Were     Pulse Height Pulse Oximetry BMI (Body Mass Index)          61 1.753 m (5' 9\") 98% 32.06 kg/m2         Blood Pressure " from Last 3 Encounters:   03/23/18 126/76    Weight from Last 3 Encounters:   03/23/18 98.5 kg (217 lb 1.6 oz)              Today, you had the following     No orders found for display       Primary Care Provider Fax #    [facility] [phone] [address] [facility] [address]        Equal Access to Services     ANTHONYMILES ANNIE : Hadii aad ku hadasho Soomaali, waaxda luqadaha, qaybta kaalmada adeegyada, waxay rufinain hayaan adesteven cortésraealex lateresita . So [facility] [phone].    ATENCIÓN: Si habla español, tiene a martin disposición servicios gratuitos de asistencia lingüística. Llame al [phone].    We comply with applicable federal civil rights laws and [place] laws. We do not discriminate on the basis of race, color, national origin, age, disability, sex, sexual orientation, or gender identity.            Thank you!     Thank you for choosing [facility]  for your care. Our goal is always to provide you with excellent care. Hearing back from our patients is one way we can continue to improve our services. Please take a few minutes to complete the written survey that you may receive in the mail after your visit with us. Thank you!             Your Updated Medication List - Protect others around you: Learn how to safely use, store and throw away your medicines at www.disposemymeds.org.          This list is accurate as of 3/23/18  1:05 PM.  Always use your most recent med list.                   Brand Name Dispense Instructions for use Diagnosis    acetaminophen 500 MG tablet    TYLENOL     Take 1,000 mg by mouth every 6 hours as needed for mild pain        GLUCOSAMINE SULFATE PO      Take 4,000 mg by mouth daily        lamoTRIgine 25 MG tablet    LaMICtal     Take 25 mg by mouth At Bedtime        LEVOTHYROXINE SODIUM PO      Take 150 mcg by mouth        NAPROXEN PO      Take 220 mg by mouth 2 times daily as needed for moderate pain        SIMVASTATIN PO      Take 40 mg by  mouth At Bedtime        sucralfate 1 GM tablet    CARAFATE     Take 1 g by mouth 4 times daily        TAMSULOSIN HCL PO      Take 0.4 mg by mouth At Bedtime        THIAMINE HCL PO      Take 50 mg by mouth daily        VITAMIN D (CHOLECALCIFEROL) PO      Take 1,000 Units by mouth daily

## 2018-03-23 NOTE — PROGRESS NOTES
RE: Gabe Barboza  [facility] #[id number]           I saw your patient, Gabe Barboza, in consultation in our pretransplant clinic. He was here at clinic for evaluation as a possible kidney donor. He presented to clinic today with his wife.  Our donor coordinator shared our center-specific results with him.  We discussed:    (1) The risks of donation--including mortality (0.03% risk) and morbidity (approximately 1% risk of major morbidity).  We discussed the major reported causes of death after kidney donation (bleeding, infection, pulmonary embolism).  We discussed the potential complications, including:  bleeding requiring reoperation, infection requiring reoperation, pneumonia, bowel obstruction, infection at the site of the incision, hernia at the site of the incision, deep vein thrombosis, deep vein thrombosis with associated pulmonary embolism, and urinary tract infection.  I told him I could not possibly name all of the risks, but that he was at risk for any complications that could occur in any operation (and that a local library would have books/resources that could provide more detail).       (2) We also discussed the long-term risks of living with one kidney.  We talked in detail about the new publications suggesting slightly increased long-term risk of ESRD after donor nephrectomy.  We discussed the fact that most of the ESRD that has developed after donation has occurred in those donating to a relative; and that it is known that individuals who have a relative with ESRD are at increased risk of ESRD.     (3) The hospital stay and the fact that if all goes well, discharge would occur within two to three days after donor nephrectomy.  We also discussed the fact that there would be no diet or fluid restrictions (again if all went well), and that the only new medication that he would be on would be pain medication.  We discussed the fact that most patients are on Tylenol or something similar within five to  six days of surgery.      (4) The recovery time after surgery and the restrictions that are necessary:  a) no driving for a couple of weeks (or until he felt that his reaction would be adequate if he had to slam on the brakes; and b) no lifting over 10 pounds or any exercise that would stretch his abdominal muscles for six weeks (to allow the muscles to heal so that he doesn't develop a hernia).  We also discussed return to work, which could occur in approximately three to four weeks if he had a desk job or would require not returning to work for at least six weeks if the job required any heavy lifting or exercise.  We discussed the potential for not getting his pep and energy back for anywhere from two to six weeks after surgery and how there was a tremendous individual variation in return of full energy level.  I discussed our donor follow-up studies; and that in our surveys, 90% of donors had their energy back by 6 weeks postdonation.    (5) The concern about long distance travel for the first couple of weeks post-donation.  We discussed the risks of deep vein thrombosis associated with plane or car travel.  I recommended that, if flying, he should get up and walk around every 30-40 minutes; if driving he should ask the  to stop the car every 30-45 minutes so Mr. Barboza could take a short walk.    (6) The fact that the recipient could be on a waiting list for  donor transplant and would ultimately receive a kidney if Mr. Barboza did not donate.      I attempted to answer any remaining questions.  I also told him that should he have any questions, he should feel free to contact us.  We would be glad to answer any questions either over the phone or at another clinic visit.  His transplant coordinator Nichole Nicole and may be reached at [phone].  Thank you for the opportunity to see him.    I spent 30 minutes with this patient.  Over 95% that time was spent in counseling and coordination of  care.             Yours truly,               Joce Mcmillan MD         Professor of Surgery         ([phone])      KEVEN/st

## 2018-03-23 NOTE — MR AVS SNAPSHOT
After Visit Summary   3/23/2018    Gabe Barboza    MRN: [id number]           Patient Information     Date Of Birth          1955        Visit Information        Provider Department      3/23/2018 11:00 AM Joce Mcmillan MD [place] [facility]        Today's Diagnoses     Transplant donor evaluation    -  1       Follow-ups after your visit        Your next 10 appointments already scheduled     Mar 23, 2018  2:30 PM CDT   (Arrive by 2:15 PM)   XR CHEST 2 VIEWS with UCXR1   [facility] Xray ([facility])    [address] [address]   [phone]           Please bring a list of your current medicines to your exam. (Include vitamins, minerals and over-thecounter medicines.) Leave your valuables at home.  Tell your doctor if there is a chance you may be pregnant.  You do not need to do anything special for this exam.            Mar 23, 2018  2:40 PM CDT   (Arrive by 2:25 PM)   CT RENAL ANGIO with UCCT2   [facility] CT ([facility])    [address] [address] [phone]           Please bring any scans or X-rays taken at other hospitals, if similar tests were done. Also bring a list of your medicines, including vitamins, minerals and over-the-counter drugs. It is safest to leave personal items at home.  Be sure to tell your doctor:   If you have any allergies.   If there s any chance you are pregnant.   If you are breastfeeding.    If you have diabetes as your medication may need to be adjusted for this exam.  You will have contrast for this exam. To prepare:   Do not eat or drink for 2 hours before your exam. If you need to take medicine, you may take it with small sips of water. (We may ask you to take liquid medicine as well.)   The day before your exam, drink extra fluids at least six 8-ounce glasses (unless your doctor tells you to  restrict your fluids).  Patients over 70 or patients with diabetes or kidney problems:   If you haven t had a blood test (creatinine test) within the last 30 days, the Cardiologist/Radiologist may require you to get this test prior to your exam.  Please wear loose clothing, such as a sweat suit or jogging clothes. Avoid snaps, zippers and other metal. We may ask you to undress and put on a hospital gown.  If you have any questions, please call the Imaging Department where you will have your exam.              Who to contact     If you have questions or need follow up information about today's clinic visit or your schedule please contact [facility]ANSPL[facility] directly at [phone].  Normal or non-critical lab and imaging results will be communicated to you by WeHaushart, letter or phone within 4 business days after the clinic has received the results. If you do not hear from us within 7 days, please contact the clinic through Chunyut or phone. If you have a critical or abnormal lab result, we will notify you by phone as soon as possible.  Submit refill requests through Stadionaut or call your pharmacy and they will forward the refill request to us. Please allow 3 business days for your refill to be completed.          Additional Information About Your Visit        Stadionaut Information     Stadionaut gives you secure access to your electronic health record. If you see a primary care provider, you can also send messages to your care team and make appointments. If you have questions, please call your primary care clinic.  If you do not have a primary care provider, please call [phone] and they will assist you.        Care EveryWhere ID     This is your Care EveryWhere ID. This could be used by other organizations to access your [facility] medical records  [id number]         Blood Pressure from Last 3 Encounters:   03/23/18 126/76    Weight from Last 3 Encounters:   03/23/18 98.5 kg (217 lb 1.6 oz)               Today, you had the following     No orders found for display       Primary Care Provider Fax #    [facility] [phone] [address] [facility] [address]        Equal Access to Services     HANNA VALENCIA : Jj Reyes, harrietkwan murillo, himata kaalina tommierhiannonkwan, chani rufinain hayaaalma cortésraealex thomas. So [facility] [phone].    ATENCIÓN: Si habla español, tiene a martin disposición servicios gratuitos de asistencia lingüística. Llame al [phone].    We comply with applicable federal civil rights laws and [place] laws. We do not discriminate on the basis of race, color, national origin, age, disability, sex, sexual orientation, or gender identity.            Thank you!     Thank you for choosing [facility]GAN TRANSPL[facility]  for your care. Our goal is always to provide you with excellent care. Hearing back from our patients is one way we can continue to improve our services. Please take a few minutes to complete the written survey that you may receive in the mail after your visit with us. Thank you!             Your Updated Medication List - Protect others around you: Learn how to safely use, store and throw away your medicines at www.disposemymeds.org.          This list is accurate as of 3/23/18  2:24 PM.  Always use your most recent med list.                   Brand Name Dispense Instructions for use Diagnosis    acetaminophen 500 MG tablet    TYLENOL     Take 1,000 mg by mouth every 6 hours as needed for mild pain        GLUCOSAMINE SULFATE PO      Take 4,000 mg by mouth daily        lamoTRIgine 25 MG tablet    LaMICtal     Take 25 mg by mouth At Bedtime        LEVOTHYROXINE SODIUM PO      Take 150 mcg by mouth        NAPROXEN PO      Take 220 mg by mouth 2 times daily as needed for moderate pain        SIMVASTATIN PO      Take 40 mg by mouth At Bedtime        sucralfate 1 GM tablet    CARAFATE     Take 1 g by mouth 4 times daily        TAMSULOSIN HCL PO       Take 0.4 mg by mouth At Bedtime        THIAMINE HCL PO      Take 50 mg by mouth daily        VITAMIN D (CHOLECALCIFEROL) PO      Take 1,000 Units by mouth daily

## 2018-03-23 NOTE — MR AVS SNAPSHOT
After Visit Summary   3/23/2018    Gabe Barboza    MRN: [id number]           Patient Information     Date Of Birth          1955        Visit Information        Provider Department      3/23/2018 12:00 PM Trista Romero RD [facility]        Today's Diagnoses     Transplant donor evaluation    -  1       Follow-ups after your visit        Your next 10 appointments already scheduled     Mar 23, 2018 12:30 PM CDT   (Arrive by 12:15 PM)   SOT CARE COORDINATOR ANDREW with Mirella Bhatt RN   [facility] Solid Organ Transplant ([facility])    [address] [place] [address]  [address]  [facility] [address]   [phone]            Mar 23, 2018  1:00 PM CDT   (Arrive by 12:30 PM)   Kidney Donor Evaluation with Dany Martinez MD   [facility] Nephrology ([facility])    [address] [place] [address]  [address]  [facility] [address]   [phone]            Mar 23, 2018  2:30 PM CDT   (Arrive by 2:15 PM)   XR CHEST 2 VIEWS with UCXR1   [facility] Xray ([facility])    [address] [facility]  [address] [facility] [id number] [phone]           Please bring a list of your current medicines to your exam. (Include vitamins, minerals and over-thecounter medicines.) Leave your valuables at home.  Tell your doctor if there is a chance you may be pregnant.  You do not need to do anything special for this exam.            Mar 23, 2018  2:40 PM CDT   (Arrive by 2:25 PM)   CT RENAL ANGIO with UCCT2   [facility] CT ([facility])    [address] [facility]  1st Floor  [facility] [id number] [phone]           Please bring any scans or X-rays taken at other hospitals, if similar tests were done. Also bring a list of your medicines, including vitamins, minerals and over-the-counter drugs. It is safest to leave personal items at home.  Be sure to tell your  doctor:   If you have any allergies.   If there s any chance you are pregnant.   If you are breastfeeding.    If you have diabetes as your medication may need to be adjusted for this exam.  You will have contrast for this exam. To prepare:   Do not eat or drink for 2 hours before your exam. If you need to take medicine, you may take it with small sips of water. (We may ask you to take liquid medicine as well.)   The day before your exam, drink extra fluids at least six 8-ounce glasses (unless your doctor tells you to restrict your fluids).  Patients over 70 or patients with diabetes or kidney problems:   If you haven t had a blood test (creatinine test) within the last 30 days, the Cardiologist/Radiologist may require you to get this test prior to your exam.  Please wear loose clothing, such as a sweat suit or jogging clothes. Avoid snaps, zippers and other metal. We may ask you to undress and put on a hospital gown.  If you have any questions, please call the Imaging Department where you will have your exam.              Who to contact     If you have questions or need follow up information about today's clinic visit or your schedule please contact [facility]GAN TRANSPL[facility] directly at [phone].  Normal or non-critical lab and imaging results will be communicated to you by MobileAdshart, letter or phone within 4 business days after the clinic has received the results. If you do not hear from us within 7 days, please contact the clinic through MobileAdshart or phone. If you have a critical or abnormal lab result, we will notify you by phone as soon as possible.  Submit refill requests through [email or web address] or call your pharmacy and they will forward the refill request to us. Please allow 3 business days for your refill to be completed.          Additional Information About Your Visit        [email or web address] Information     [email or web address] gives you secure access to your electronic health record. If you see a primary care provider, you can  also send messages to your care team and make appointments. If you have questions, please call your primary care clinic.  If you do not have a primary care provider, please call [phone] and they will assist you.        Care EveryWhere ID     This is your Care EveryWhere ID. This could be used by other organizations to access your [facility] medical records  [id number]         Blood Pressure from Last 3 Encounters:   03/23/18 114/78    Weight from Last 3 Encounters:   03/23/18 98.5 kg (217 lb 1.6 oz)              Today, you had the following     No orders found for display       Primary Care Provider Fax #    [facility] [phone] [address] [facility] [address]        Equal Access to Services     HANNA VALENCIA : Jj Reyes, justin murillo, qatroy kaalmakwan peterson, chani escobar . So [facility] [phone].    ATENCIÓN: Si habla español, tiene a martin disposición servicios gratuitos de asistencia lingüística. Llame al [phone].    We comply with applicable federal civil rights laws and [place] laws. We do not discriminate on the basis of race, color, national origin, age, disability, sex, sexual orientation, or gender identity.            Thank you!     Thank you for choosing [facility] SOLID ORGAN TRANSPLANT  for your care. Our goal is always to provide you with excellent care. Hearing back from our patients is one way we can continue to improve our services. Please take a few minutes to complete the written survey that you may receive in the mail after your visit with us. Thank you!             Your Updated Medication List - Protect others around you: Learn how to safely use, store and throw away your medicines at www.disposemymeds.org.      Notice  As of 3/23/2018 12:28 PM    You have not been prescribed any medications.

## 2018-03-23 NOTE — MR AVS SNAPSHOT
After Visit Summary   3/23/2018    Gabe Barboza    MRN: [id number]           Patient Information     Date Of Birth          1955        Visit Information        Provider Department      3/23/2018 12:30 PM Mirella Bhatt RN [facility]        Today's Diagnoses     Transplant donor evaluation    -  1       Follow-ups after your visit        Who to contact     If you have questions or need follow up information about today's clinic visit or your schedule please contact [facility] SOL[facility] ORGAN TRANSPL[facility] directly at [phone].  Normal or non-critical lab and imaging results will be communicated to you by ASI System Integrationhart, letter or phone within 4 business days after the clinic has received the results. If you do not hear from us within 7 days, please contact the clinic through Intersystems Internationalt or phone. If you have a critical or abnormal lab result, we will notify you by phone as soon as possible.  Submit refill requests through SyCara Local or call your pharmacy and they will forward the refill request to us. Please allow 3 business days for your refill to be completed.          Additional Information About Your Visit        MyChart Information     SyCara Local gives you secure access to your electronic health record. If you see a primary care provider, you can also send messages to your care team and make appointments. If you have questions, please call your primary care clinic.  If you do not have a primary care provider, please call [phone] and they will assist you.        Care EveryWhere ID     This is your Care EveryWhere ID. This could be used by other organizations to access your [facility] medical records  [id number]         Blood Pressure from Last 3 Encounters:   03/23/18 126/76    Weight from Last 3 Encounters:   03/23/18 98.5 kg (217 lb 1.6 oz)              Today, you had the following     No orders found for display       Primary Care Provider Fax #    [facility]  [phone]       [facility] [address]        Equal Access to Services     ANTHONYMILES ANNIE : Hadjunior Reyes, justin murillo, crystroy narayanmakwan peterson, chani cortésraealex thomas. So [facility] [phone].    ATENCIÓN: Si habla español, tiene a martin disposición servicios gratuitos de asistencia lingüística. Llame al [phone].    We comply with applicable federal civil rights laws and [place] laws. We do not discriminate on the basis of race, color, national origin, age, disability, sex, sexual orientation, or gender identity.            Thank you!     Thank you for choosing [facility] SOLID ORGAN TRANSPLANT  for your care. Our goal is always to provide you with excellent care. Hearing back from our patients is one way we can continue to improve our services. Please take a few minutes to complete the written survey that you may receive in the mail after your visit with us. Thank you!             Your Updated Medication List - Protect others around you: Learn how to safely use, store and throw away your medicines at www.disposemymeds.org.          This list is accurate as of 3/23/18 11:59 PM.  Always use your most recent med list.                   Brand Name Dispense Instructions for use Diagnosis    acetaminophen 500 MG tablet    TYLENOL     Take 1,000 mg by mouth every 6 hours as needed for mild pain        GLUCOSAMINE SULFATE PO      Take 4,000 mg by mouth daily        lamoTRIgine 25 MG tablet    LaMICtal     Take 25 mg by mouth At Bedtime        LEVOTHYROXINE SODIUM PO      Take 150 mcg by mouth        NAPROXEN PO      Take 220 mg by mouth 2 times daily as needed for moderate pain        SIMVASTATIN PO      Take 40 mg by mouth At Bedtime        sucralfate 1 GM tablet    CARAFATE     Take 1 g by mouth 4 times daily        TAMSULOSIN HCL PO      Take 0.4 mg by mouth At Bedtime        THIAMINE HCL PO      Take 50 mg by mouth daily        VITAMIN D (CHOLECALCIFEROL) PO       Take 1,000 Units by mouth daily

## 2018-03-25 LAB
M TB TUBERC IFN-G BLD QL: NEGATIVE
M TB TUBERC IFN-G/MITOGEN IGNF BLD: 0 IU/ML
STRONGYLOIDES IGG SER IA-ACNC: 0.07 IV

## 2018-03-25 NOTE — NURSING NOTE
Saw Gabe in clinic [facility] donor evaluation.    Came with wife on 3-23-18  Has offered to be donor to friend  Discussed surgery hospitalization and recovery.  Know when and how to obtain evaluation results and the process for scheduling surgery.  [facility]  Signed consent for donor evaluation.  Donor Signed ADORE.  Requested routine cancer screening tests:    Will request neg colonoscopy-  Donor thought he had test in  and it was neg. Questions answered.  Approx. time spent:  45 minutes  Donor has medical insurance:  Yes    Consents signed for PEP.  Gabe is having hernia repair surgery on 3-30-18.  He is having a midline and right inguinal repair. Dr. Mcmilaln examined Gabe.      Living Kidney Donor Consent per OPTN Policy 14.3 for Transplant Coordinators    Written assurance has been obtained from the patient that the donor:   1) Is willing to donate  2) Is free from inducement and coercion  3) Has been informed that the donor may decline to donate at any time  4) Has been informed that transplant centers must:     A) Offer donors an opportunity to discontinue the donor consent or evaluation process in a way that is protected and confidential    B) Provide an independent donor advocate (JORDI) to assist the potential donor during this process    The following was presented in a language in which donor is able to engage in meaningful dialogue and was reviewed and discussed with the patient by the transplant coordinator and the physician.     The following has been provided:   Education and instruction about all phases of the living donation process includin) Consent  2) The donor must undergo medical and psychosocial evaluations  3) Disclosure that the recovery hospital will take all reasonable precautions to provide confidentiality for the donor/recipient  4) Disclosure that it is a federal crime for any person to knowingly acquire, obtain or otherwise transfer any human organ for  valuable consideration  5) The transplant hospital may refuse the potential donor, and the donor could be evaluated by another transplant program with different selection criteria  6) Data from the most recent SRTR center-specific reports:     A) National 1-year patient and graft survival rates    B) Hospital s 1-year patient and graft survival rates    C) Notification about all CMS outcome requirements not being met by the recovery and recipient s hospitals    The following has been provided:   1) Education about expected post-donation kidney function and how chronic kidney disease and end-stage renal disease might potentially impact the donor in the future to include:    A) On average, donors will have 25-35% permanent loss of kidney function at donation    B) Baseline risk of End Stage Renal Disease (ESRD)  does not exceed that of members of general population with same demographic profile    C) Donor risks must be interpreted in light of known epidemiology of both Chronic Kidney Disease (CKD) or ESRD    D) CKD generally develops in midlife (40-50 years old)    E) ESRD generally develops after age 60    F) Medical evaluation of young potential donor cannot predict lifetime risk  2) Donors may be at higher risk for CKD if they sustain damage to the remaining kidney. 3) Development of CKD and progression to ESRD may be more rapid with only 1 kidney  4) Dialysis is required when reaching ESRD  5) Current practice prioritizes prior living kidney donors who became kidney transplant candidates  6) Alternate procedures or courses of treatment for the recipient, including  donor transplant    A) A  donor kidney may become available for the recipient before donor evaluation is complete or transplant occurs    B) Any transplant candidate may have risk factors for increased morbidity or mortality that are not disclosed to the potential donor  7) The patient will receive a thorough medical and psychosocial  evaluation  8) Health information obtained during the evaluation is subject to the same regulations as all records and could reveal conditions that must be reported to local, state, or federal public health authorities  9) The [facility] [place], is required to report living donor follow up information at 6, 12, and 24 months  10) Potential donors must commit to post operative follow up testing coordinated by the [facility]  11) Any infectious disease or malignancy pertinent to acute recipient care discovered during the potential donor s first two years of follow up care:    A) Will be disclosed to the donor    B) May need to be reported to local, state or federal public health authorities    C) Will be disclosed to their recipient s transplant center, and    D) Will be reported through the OPTN Improving Patient Safety Portal    Disclosure has been provided that these risks may be transient or permanent & include but are not limited to potential medical or surgical risks:    Death    Scars, pain, fatigue, and other consequences typical of any surgical procedure    Decreased kidney function    Abdominal or bowel symptoms such as bloating and nausea, and developing bowel obstruction    Kidney failure and the need for dialysis or kidney transplant for the donor  Impact of obesity, hypertension or other donor-specific medical conditions on morbidity and mortality of the potential donor    PAUL Jones, RN     Transplant Coordinator  [phone]  [phone]

## 2018-03-26 LAB — INTERPRETATION ECG - MUSE: NORMAL

## 2018-03-27 LAB
BSA: 2.22 M2
IOHEXOL CL UR+SERPL-VRATE: 4.32 MG/DL
IOHEXOL CL UR+SERPL-VRATE: 71 /1.73 M2
IOHEXOL CL UR+SERPL-VRATE: 9.14 MG/DL
IOHEXOL CL UR+SERPL-VRATE: 91 ML/MIN
T CRUZI AB SER DONR QL: NONREACTIVE

## 2018-03-28 ENCOUNTER — TELEPHONE (OUTPATIENT)
Dept: TRANSPLANT | Facility: CLINIC | Age: 63
End: 2018-03-28

## 2018-03-28 ENCOUNTER — COMMITTEE REVIEW (OUTPATIENT)
Dept: TRANSPLANT | Facility: CLINIC | Age: 63
End: 2018-03-28

## 2018-03-28 ENCOUNTER — MEDICAL CORRESPONDENCE (OUTPATIENT)
Dept: TRANSPLANT | Facility: CLINIC | Age: 63
End: 2018-03-28

## 2018-03-28 NOTE — COMMITTEE REVIEW
Living Donor Committee Review Note Evaluation Date: 3/23/2018  Committee Review Date: 3/28/2018    Donor being evaluated for: Kidney    Transplant Phase: Evaluation  Transplant Status: Active    Transplant Coordinator: Nichole Nicole  Transplant Surgeon:       Committee Review Members:  Steph Romero, RD   Pharmacy [facility]    - Clinical Manjula Little, [facility], Adriana Obrien, [facility]   Transplant Laney Sienna Rossi, REECE, Mirella Bhatt RN, Joce Mcmlilan MD, Jeannette Lloyd MD, Ciara Patterson LPN, Nichole Nicole RN, Giovanny Acevedo MD       Transplant Eligibility: Acceptable Mental Health    Committee Review Decision: Declined    Relative Contraindications: Other, Renal Anatomy    Absolute Contraindications: None    Committee Chair Giovanny Acevedo MD verbally attested to the committee's decision.    Committee Discussion Details: Due to Kidney Anatomy the patient is denied.  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

## 2018-03-28 NOTE — TELEPHONE ENCOUNTER
I called the patient to review the findings of his donor evaluation.  I reviewed that due to the renal anatomy he will not be a donor and the donor team recommends that he see his PCP for further review of the finding.  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  I also reviewed that he has multiple arteries that supply each of his kidneys.  The CT also found left kidney lesions.   I also reviewed that if there was not the finding of the aneurysm he still would not be a donor due to the complex nature of his left kidney having multiple arteries in combination with the lesions.    I told him that our staff is creating a disk of the CT and we will send that along with print out of the report to his home.  He stated understanding and said he will take this information to his PCP team.  I stated they may want him to see a vascular surgeon for a referral to assess the celiac aneurysm.    I thanked him for his interest in being a donor.  I offered to send to him my contact information via email in case he or his PCP team have any questions.  He stated he would like me to send him my contact information.

## 2018-03-29 ENCOUNTER — TELEPHONE (OUTPATIENT)
Dept: TRANSPLANT | Facility: CLINIC | Age: 63
End: 2018-03-29

## 2018-03-29 NOTE — TELEPHONE ENCOUNTER
I received a call from Laura the patient's wife, she was concerned that the surgeon performing her husbands hernia repair tomorrow would need to get the information from the CT.  I called the pateint and Michael confirmed that it is okay for me to reach out to the [facility] and give them the CT report for review.  I have called the [facility] and spoke to Care Team 4 nurse Neelima,  I have faxed the CT report to them.  She is going to relay the information to the surgical team.  I reviewed with the patient that the disk is being shipped to his home and is due for UPS delivery tomorrow afternoon.

## 2018-04-09 ENCOUNTER — TELEPHONE (OUTPATIENT)
Dept: TRANSPLANT | Facility: CLINIC | Age: 63
End: 2018-04-09

## 2018-04-10 ENCOUNTER — TELEPHONE (OUTPATIENT)
Dept: TRANSPLANT | Facility: CLINIC | Age: 63
End: 2018-04-10

## 2018-04-10 NOTE — TELEPHONE ENCOUNTER
I spoke to Mr. Barboza today.  He is having trouble having his physician, Dr. Soto on Team 4 at the [facility], ( His doctor's nurse is Brianne), be able to read and see the images we cent on CD.  [facility] docotor [facility] but cannot see the images in order to make the referral.  I told Michael that I would talk to Nichole, his RN coordinator, and have her help me figure this out.    ZHANNA Goins, [facility]  Clinical  and Independent Donor Advocate  [facility]  Pager:  [phone]  Direct:  [phone]

## 2018-04-11 ENCOUNTER — TELEPHONE (OUTPATIENT)
Dept: TRANSPLANT | Facility: CLINIC | Age: 63
End: 2018-04-11

## 2018-04-11 NOTE — TELEPHONE ENCOUNTER
I received a call from Michael that he needs help with the hand off to his [facility].  They need further clarification on the finding on the CT.  On Monday I spoke to the [facility] calling service who put in a message to Care Team 4 nurse to call me back.  I gave them my pager number.    Today I received a call back and spoke to Marisela.  She would like to get more information on the finding and the recommendations from our team to give to Dr Soto.  I have faxed to their team the Committee review note, letter of explanation, Nephrology note, CT report and Labs.  Last week I had our team send a disk of the CT and report.  Marisela confirmed that they received the disk and the report.

## 2020-03-11 ENCOUNTER — HEALTH MAINTENANCE LETTER (OUTPATIENT)
Age: 65
End: 2020-03-11

## 2020-12-27 ENCOUNTER — HEALTH MAINTENANCE LETTER (OUTPATIENT)
Age: 65
End: 2020-12-27

## 2021-04-25 ENCOUNTER — HEALTH MAINTENANCE LETTER (OUTPATIENT)
Age: 66
End: 2021-04-25

## 2021-10-09 ENCOUNTER — HEALTH MAINTENANCE LETTER (OUTPATIENT)
Age: 66
End: 2021-10-09

## 2022-09-17 ENCOUNTER — HEALTH MAINTENANCE LETTER (OUTPATIENT)
Age: 67
End: 2022-09-17

## 2023-01-23 ENCOUNTER — HEALTH MAINTENANCE LETTER (OUTPATIENT)
Age: 68
End: 2023-01-23

## 2024-02-24 ENCOUNTER — HEALTH MAINTENANCE LETTER (OUTPATIENT)
Age: 69
End: 2024-02-24